# Patient Record
Sex: MALE | Race: WHITE | Employment: FULL TIME | URBAN - METROPOLITAN AREA
[De-identification: names, ages, dates, MRNs, and addresses within clinical notes are randomized per-mention and may not be internally consistent; named-entity substitution may affect disease eponyms.]

---

## 2017-07-27 ENCOUNTER — APPOINTMENT (OUTPATIENT)
Dept: GENERAL RADIOLOGY | Facility: CLINIC | Age: 52
End: 2017-07-27
Attending: FAMILY MEDICINE

## 2017-07-27 ENCOUNTER — HOSPITAL ENCOUNTER (EMERGENCY)
Facility: CLINIC | Age: 52
Discharge: HOME | End: 2017-07-28
Attending: FAMILY MEDICINE | Admitting: FAMILY MEDICINE
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
## 2017-07-27 DIAGNOSIS — S52.121A CLOSED DISPLACED FRACTURE OF HEAD OF RIGHT RADIUS, INITIAL ENCOUNTER: ICD-10-CM

## 2017-07-27 DIAGNOSIS — M79.631 PAIN OF RIGHT FOREARM: ICD-10-CM

## 2017-07-27 PROCEDURE — 99284 EMERGENCY DEPT VISIT MOD MDM: CPT | Performed by: FAMILY MEDICINE

## 2017-07-27 PROCEDURE — 73070 X-RAY EXAM OF ELBOW: CPT | Mod: TC,RT

## 2017-07-27 PROCEDURE — 99284 EMERGENCY DEPT VISIT MOD MDM: CPT | Mod: Z6 | Performed by: FAMILY MEDICINE

## 2017-07-27 PROCEDURE — 73090 X-RAY EXAM OF FOREARM: CPT | Mod: TC,RT

## 2017-07-27 NOTE — LETTER
[facility]  Emergency Room  [address] [place]   [address]  Tel: [phone]   Fax: [phone]  2017    Antonio Ravi  [address] [address]  [phone] (home)     : 1965          To Whom it May Concern:    Antonio Ravi was seen in our Emergency Department today, 2017. He has a fracture to his right elbow area and needs to remain in the sling until seen for recheck in the Ortho clinic on Thursday this next week. He may return to work as long as he is allowed to use the sling and not use his right arm. He should not drive or operate machinery if needing to use narcotic pain medications for his elbow pain.      Please contact me for questions or concerns.    Sincerely,      Ananth Scruggs  Physician  [facility]

## 2017-07-27 NOTE — ED AVS SNAPSHOT
[facility] Emergency Department    [address] [place] [address]    Phone:  [phone]    Fax:  [phone]                                       Antonio Ravi   MRN: [id number]    Department:  [facility] Emergency Department   Date of Visit:  7/27/2017           After Visit Summary Signature Page     I have received my discharge instructions, and my questions have been answered. I have discussed any challenges I see with this plan with the nurse or doctor.    ..........................................................................................................................................  Patient/Patient Representative Signature      ..........................................................................................................................................  Patient Representative Print Name and Relationship to Patient    ..................................................               ................................................  Date                                            Time    ..........................................................................................................................................  Reviewed by Signature/Title    ...................................................              ..............................................  Date                                                            Time

## 2017-07-28 VITALS
HEART RATE: 78 BPM | DIASTOLIC BLOOD PRESSURE: 109 MMHG | OXYGEN SATURATION: 95 % | RESPIRATION RATE: 20 BRPM | BODY MASS INDEX: 48.67 KG/M2 | TEMPERATURE: 98.4 F | WEIGHT: 315 LBS | SYSTOLIC BLOOD PRESSURE: 138 MMHG

## 2017-07-28 RX ORDER — HYDROCODONE BITARTRATE AND ACETAMINOPHEN 5; 325 MG/1; MG/1
1-2 TABLET ORAL EVERY 6 HOURS PRN
Qty: 15 TABLET | Refills: 0 | Status: SHIPPED | OUTPATIENT
Start: 2017-07-28 | End: 2017-07-28

## 2017-07-28 RX ORDER — IBUPROFEN 200 MG
600 TABLET ORAL EVERY 6 HOURS PRN
Refills: 0 | COMMUNITY
Start: 2017-07-28 | End: 2017-07-31

## 2017-07-28 RX ORDER — HYDROCODONE BITARTRATE AND ACETAMINOPHEN 5; 325 MG/1; MG/1
1-2 TABLET ORAL EVERY 6 HOURS PRN
Qty: 15 TABLET | Refills: 0 | Status: SHIPPED | OUTPATIENT
Start: 2017-07-28 | End: 2017-08-10

## 2017-07-28 ASSESSMENT — ENCOUNTER SYMPTOMS
WOUND: 0
COLOR CHANGE: 0
JOINT SWELLING: 1
NECK PAIN: 0
NUMBNESS: 1
NECK STIFFNESS: 0
ARTHRALGIAS: 1
ACTIVITY CHANGE: 1
BACK PAIN: 0
CHILLS: 0

## 2017-07-28 NOTE — DISCHARGE INSTRUCTIONS
Please read and follow the handout(s) instructions. Return, if needed, for increased or worsening symptoms and as directed by the handout(s).    See the note for work.    Do not drive if using the hydrocodone medication.    You will be scheduled for a follow-up appointment in your clinic next week.    You may apply ice or cold packs to the area for 10-15 minutes every 1-2 hours as needed to relieve pain and/or swelling.    Use the sling until rechecked in the Ortho clinic.    I hope you feel better soon!    Electronically signed, Ananth Scruggs DO

## 2017-07-28 NOTE — ED PROVIDER NOTES
History     Chief Complaint   Patient presents with     Arm Injury     HPI  Antonio Ravi is a 51 year old male who presented to the emergency room with concerns of pain to his right elbow and forearm.  Patient states that he was at his home approximately 1 hour ago when he went outside to walk on the sidewalk and tripped over some children's toys on the sidewalk causing him to fall forward using his right arm to brace his fall.  He was on his way to work and so did continue to go to work but once at work was unable to perform his work duties do to significant pain to his right elbow and forearm region.  He denies other injury associated with the fall.  Patient states that he has had history for surgery to the left shoulder but this was not injured in the fall today.  Patient refused offer of pain medications at the time of exam.  Ice applied to the right elbow area.    I have reviewed the Medications, Allergies, Past Medical and Surgical History, and Social History in the Epic system.    Patient Active Problem List   Diagnosis     Patellar tendinitis of right knee     Prepatellar bursitis of right knee       History reviewed. No pertinent past medical history.     Past Surgical History:   Procedure Laterality Date     ORTHOPEDIC SURGERY      left shoulder       No family history on file.    Social History     Social History     Marital status: Single     Spouse name: N/A     Number of children: N/A     Years of education: N/A     Occupational History     Not on file.     Social History Main Topics     Smoking status: Current Every Day Smoker     Packs/day: 1.00     Smokeless tobacco: Never Used     Alcohol use Yes      Comment: occassionally     Drug use: Not on file     Sexual activity: Not on file     Other Topics Concern     Not on file     Social History Narrative       Current Outpatient Rx   Medication Sig Dispense Refill     ibuprofen (ADVIL,MOTRIN) 200 MG tablet Take 4 tablets (800 mg) by mouth every  8 hours as needed for pain (with food)         No Known Allergies      There is no immunization history on file for this patient.      Review of Systems   Constitutional: Positive for activity change. Negative for chills.   Musculoskeletal: Positive for arthralgias (right elbow pain) and joint swelling (wrist). Negative for back pain, neck pain and neck stiffness.   Skin: Negative for color change and wound.   Neurological: Positive for numbness (states he has a tingling sensation from his elbow to his fingers involving all the fingers since the fall occurred.).   All other systems reviewed and are negative.      Physical Exam   BP: (!) 140/124  Pulse: 80  Temp: 98.4  F (36.9  C)  Resp: 20  Weight: (!) 145.2 kg (320 lb)  SpO2: 95 %  Physical Exam   Constitutional: He is oriented to person, place, and time. He appears well-developed and well-nourished. He appears distressed (right elbow pain symptoms.).   Morbidly obese 51-year-old appears to be in distress secondary to right forearm and elbow pain.   HENT:   Head: Normocephalic and atraumatic.   Eyes: Conjunctivae and EOM are normal. Pupils are equal, round, and reactive to light.   Neck: Normal range of motion. Neck supple.   Cardiovascular: Normal rate and intact distal pulses.    Pulmonary/Chest: Effort normal. No respiratory distress.   Abdominal: There is no tenderness.   Musculoskeletal:        Right elbow: He exhibits decreased range of motion, swelling and effusion (effusion noted to the olecranon bursal area right elbow.). He exhibits no deformity and no laceration. Tenderness (other diffuse tenderness surrounding the right elbow.  No point tenderness to the medial epicondyle, lateral epicondyle, or olecranon process identified on palpation.) found.   Neurological: He is alert and oriented to person, place, and time. No sensory deficit. He exhibits normal muscle tone.   Patient describes a tingling sensation in his right forearm and hand involving all of  his fingers but has good touch sensation distally in the right hand and fingers.  He has normal motor strength.   Skin: Skin is warm. No bruising and no rash noted.   Nursing note and vitals reviewed.      ED Course     ED Course     Procedures             Critical Care time:  none            Results for orders placed or performed during the hospital encounter of 07/27/17 (from the past 24 hour(s))   XR Elbow Right 2 Views    Narrative    XR ELBOW RT 2 VW, XR FOREARM RT 2 VW  7/28/2017 12:00 AM      HISTORY: Fall, pain.     COMPARISON: None.      Impression    IMPRESSION: There is a mildly displaced fracture of the radial head.  No other fracture or dislocation. Elbow joint effusion.   Radius/Ulna XR, PA & LAT, right    Narrative    XR ELBOW RT 2 VW, XR FOREARM RT 2 VW  7/28/2017 12:00 AM      HISTORY: Fall, pain.     COMPARISON: None.      Impression    IMPRESSION: There is a mildly displaced fracture of the radial head.  No other fracture or dislocation. Elbow joint effusion.         Assessments & Plan (with Medical Decision Making)  51 yr old male to the ER with concerns about right elbow area pain following a fall while walking earlier tonight. Exam findings consistent with a proximal radial head fracture. I spoke with Dr. Ananth Carrillo, Ortho, who felt the patient may not need surgery but recommended he be rechecked in the Ortho clinic this next week. Sling applied to the right arm and the patient was scheduled with the Ortho clinic on Thursday.  A work ability note was generated for his employer.  He was prescribed additional pain medications to use as needed.       I have reviewed the nursing notes.    I have reviewed the findings, diagnosis, plan and need for follow up with the patient.      Discharge Medication List as of 7/28/2017  1:14 AM      START taking these medications    Details   HYDROcodone-acetaminophen (NORCO) 5-325 MG per tablet Take 1-2 tablets by mouth every 6 hours as needed for moderate to  severe pain, Disp-15 tablet, R-0, Local Print                I verbally discussed the findings of the evaluation today in the ER. I have verbally discussed with Antonio the suggested treatment(s) as described in the discharge instructions and handouts. I have prescribed the above listed medications and instructed him on appropriate use of these medications.      I have verbally suggested he follow-up in his clinic or return to the ER for increased symptoms. See the follow-up recommendations documented  in the after visit summary in this visit's EPIC chart.    Final diagnoses:   Closed displaced fracture of head of right radius, initial encounter   Fall, initial encounter   Pain of right forearm       7/27/2017   [facility] EMERGENCY DEPARTMENT     Ananth Scruggs,   07/28/17 0231

## 2017-08-03 ENCOUNTER — RADIANT APPOINTMENT (OUTPATIENT)
Dept: GENERAL RADIOLOGY | Facility: CLINIC | Age: 52
End: 2017-08-03
Attending: ORTHOPAEDIC SURGERY

## 2017-08-03 ENCOUNTER — OFFICE VISIT (OUTPATIENT)
Dept: ORTHOPEDICS | Facility: CLINIC | Age: 52
End: 2017-08-03

## 2017-08-03 VITALS — HEIGHT: 67 IN | BODY MASS INDEX: 49.44 KG/M2 | RESPIRATION RATE: 18 BRPM | WEIGHT: 315 LBS

## 2017-08-03 DIAGNOSIS — M79.644 PAIN OF FINGER OF RIGHT HAND: ICD-10-CM

## 2017-08-03 DIAGNOSIS — S52.121A CLOSED DISPLACED FRACTURE OF HEAD OF RIGHT RADIUS, INITIAL ENCOUNTER: Primary | ICD-10-CM

## 2017-08-03 PROCEDURE — 99203 OFFICE O/P NEW LOW 30 MIN: CPT | Performed by: ORTHOPAEDIC SURGERY

## 2017-08-03 PROCEDURE — 73130 X-RAY EXAM OF HAND: CPT | Mod: TC

## 2017-08-03 ASSESSMENT — PAIN SCALES - GENERAL: PAINLEVEL: MODERATE PAIN (4)

## 2017-08-03 NOTE — LETTER
Antonio Ravi  [address] [address]        August 3, 2017           To Whom It May Concern:    Antonio Ravi was seen in our clinic. He may return to work with the following: no use of Right upper extremity until follow up. Next appointment is 1 week.      Sincerely,        Dane Perez, DO

## 2017-08-03 NOTE — MR AVS SNAPSHOT
"              After Visit Summary   8/3/2017    Antonio Ravi    MRN: [id number]           Patient Information     Date Of Birth          1965        Visit Information        Provider Department      8/3/2017 8:10 AM Dane Perez, DO [facility]         Follow-ups after your visit        Who to contact     If you have questions or need follow up information about today's clinic visit or your schedule please contact [facility] directly at [phone].  Normal or non-critical lab and imaging results will be communicated to you by Weeblyhart, letter or phone within 4 business days after the clinic has received the results. If you do not hear from us within 7 days, please contact the clinic through Weeblyhart or phone. If you have a critical or abnormal lab result, we will notify you by phone as soon as possible.  Submit refill requests through [employer] [facility] or call your pharmacy and they will forward the refill request to us. Please allow 3 business days for your refill to be completed.          Additional Information About Your Visit        [facility] Information     [employer] [facility] lets you send messages to your doctor, view your test results, renew your prescriptions, schedule appointments and more. To sign up, go to [email or web address] . Click on \"Log in\" on the left side of the screen, which will take you to the Welcome page. Then click on \"Sign up Now\" on the right side of the page.     You will be asked to enter the access code listed below, as well as some personal information. Please follow the directions to create your username and password.     Your access code is: [email or web address]  Expires: 10/26/2017  1:14 AM     Your access code will  in 90 days. If you need help or a new code, please call your [facility] or [phone].        Care EveryWhere ID     This is your Care EveryWhere ID. This could be used by other organizations to access your [facility] medical " "records  [id number]        Your Vitals Were     Respirations Height BMI (Body Mass Index)             18 1.708 m (5' 7.25\") 57.44 kg/m2          Blood Pressure from Last 3 Encounters:   07/28/17 (!) 138/109   08/23/16 (!) 148/100   08/31/15 116/72    Weight from Last 3 Encounters:   08/03/17 (!) 167.6 kg (369 lb 8 oz)   07/27/17 (!) 145.2 kg (320 lb)   08/23/16 (!) 140.6 kg (310 lb)              Today, you had the following     No orders found for display       Primary Care Provider    Physician No Ref-Primary       No address on file        Equal Access to Services     VANESSA SHARP : Carlin Cooper, giuseppe lang, aleks kaalmaverito mauricio, amos wilson . So [facility] [phone].    ATENCIÓN: Si habla español, tiene a sampson disposición servicios gratuitos de asistencia lingüística. Llame al [phone].    We comply with applicable federal civil rights laws and [place] laws. We do not discriminate on the basis of race, color, national origin, age, disability sex, sexual orientation or gender identity.            Thank you!     Thank you for choosing [facility]  for your care. Our goal is always to provide you with excellent care. Hearing back from our patients is one way we can continue to improve our services. Please take a few minutes to complete the written survey that you may receive in the mail after your visit with us. Thank you!             Your Updated Medication List - Protect others around you: Learn how to safely use, store and throw away your medicines at www.disposemymeds.org.          This list is accurate as of: 8/3/17  8:14 AM.  Always use your most recent med list.                   Brand Name Dispense Instructions for use Diagnosis    HYDROcodone-acetaminophen 5-325 MG per tablet    NORCO    15 tablet    Take 1-2 tablets by mouth every 6 hours as needed for moderate to severe pain    Closed displaced fracture of head of right radius, " initial encounter, Pain of right forearm

## 2017-08-03 NOTE — NURSING NOTE
"Chief Complaint   Patient presents with     Musculoskeletal Problem     Right elbow injury- DOI: 7/27/17-tripped and fell     Consult     ref: ED       Initial Resp 18  Ht 1.708 m (5' 7.25\")  Wt (!) 167.6 kg (369 lb 8 oz)  BMI 57.44 kg/m2 Estimated body mass index is 57.44 kg/(m^2) as calculated from the following:    Height as of this encounter: 1.708 m (5' 7.25\").    Weight as of this encounter: 167.6 kg (369 lb 8 oz).  Medication Reconciliation: complete   Steph/SUKHI     "

## 2017-08-03 NOTE — PROGRESS NOTES
ORTHOPEDIC CONSULT      Chief Complaint: Antonio Ravi is a 51 year old male who is being seen for Chief Complaint   Patient presents with     Musculoskeletal Problem     Right elbow injury- DOI: 7/27/17-tripped and fell     Consult     ref: ED     Antonio Ravi is a 51 year old male who is seen in consultation at the request of Dr. Scruggs for evaluation of right proximal radius head fracture.    History of Present Illness:   Right handed male.   Patient seen in ED on 7/27/17 after a fall and right elbow and forearm pain.  Patient was found to have proximal radial head fracture.  Patient was placed in a sling and recommended to follow up with orthopedics. Patient states since the fall the swelling in his hand and elbow will go down with elevation but once in the sling or with working the swelling increases again.  He states the elbow pain is about the same as it was 7 days ago after the fall but states the pain in his hand along the pinky finger is worsening.  He also states the global hand numbness is about the same, but can feel just feels more numb than the left hand.  Patient states initially hand was not bad and did not bring up the hand pain to the ED doctor but the hand pain has been progressively worse.      Mechanism of Injury: A direct strike to the affected area.  Pain resulted ground level fall. Tripped over toys fell onto outstretched right arm  Location: right elbow (global elbow), hand along pinky finger  Duration of Pain:  7 days since fall  Rating of Pain:  Moderate at times, severe at times.    Pain Quality: sharp and pressure  Pain is better with: Rest and elevation along with Norco  Pain is worse with:  working, being in the sling, not elevating arm  Treatment so far consists of:  rest, Ice, Norco, sling.   Associated Features: Swelling, Numbness:  entire hand  Prior history of related problems:   No previous problem in the affected area.  Pain is limiting work. Pain is limiting normal  "activities of daily living. Pain is waking at night. The patient has pain at rest.  Here for Orthopedic consultation.  Pain is about same to elbow worse to hand.        Patient's past medical, surgical, social and family histories reviewed.     No past medical history on file.    Past Surgical History:   Procedure Laterality Date     ORTHOPEDIC SURGERY      left shoulder       Medications:    Current Outpatient Prescriptions on File Prior to Visit:  HYDROcodone-acetaminophen (NORCO) 5-325 MG per tablet Take 1-2 tablets by mouth every 6 hours as needed for moderate to severe pain     No current facility-administered medications on file prior to visit.     No Known Allergies    Social History     Occupational History     Not on file.     Social History Main Topics     Smoking status: Current Every Day Smoker     Packs/day: 1.00     Smokeless tobacco: Never Used     Alcohol use Yes      Comment: occassionally     Drug use: Not on file     Sexual activity: Not on file       No family history on file.    REVIEW OF SYSTEMS  10 point review systems performed otherwise negative as noted as per history of present illness.    Physical Exam:  Vitals: Resp 18  Ht 1.708 m (5' 7.25\")  Wt (!) 167.6 kg (369 lb 8 oz)  BMI 57.44 kg/m2  Constitutional: healthy, alert and in mild acute distress. Large body habitus   Psychiatric: mentation appears normal and affect normal/bright  NEURO: no focal deficits  RESP: Normal with easy respirations and no use of accessory muscles to breathe, no audible wheezing or retractions.   CV: RUE:  Shoulder to hand, cap refill <2, swelling to hand, radial pulse 2+.           Regular rate and rhythm by palpation  SKIN: No erythema, rashes, excoriation, or breakdown. No evidence of infection.   JOINT/EXTREMITIES:right Elbow Exam: Inspection: swelling  Right hand: swelling throughout fingers  Tender: global elbow, worse around radial head  Tender hand: 5th metacarpal region/mcp  through the small and " ring finger  Range of Motion: severely limited by pain in the elbow. Once working through the pain is able to get 30-95 degrees ROM with soft endpoints. Full forearm rotation.  Strength: limited by pain.  Finger shows no signs of instability at the MCP joint.    GAIT: not examined    Diagnostic Modalities:  right hand: no acute fracture, dislocation, or other abnormality visualized.  right elbow X-ray: mildly displaced distal radius head fracture  Independent visualization of the images was performed.      Impression: right minimally displaced Radial head fracture  Right ulnar-sided Hand pain-probable contusion    Plan:  All of the above pertinent physical exam and imaging modalities findings was reviewed with Antonio.    ROM is intact to right elbow along with distal extremity is also neurovascularly intact.  Right hand pain is likely due to bruising and no abnormalities seen on xray.  He does have some global hand numbness but this is not worsening.  ROM, CMS otherwise intact to hand.  With the radial head fracture will continue to recommend slinging with range of motion exercises to prevent contracture to the elbow.  Displacement of the radius head is borderline to recommend further intervention.  Discussed with patient and will continue conservative therapy but will bring him back early next week to re-evaluate.     I recommend conservative care for the patient to include rest, sling, non weigh-bearing, no pushing, pulling, and/or lifting, ROM.     Restrictions: encouraged ROM to elbow but no lifting pushing pulling and no lifting    Could work with above restrictions.    Return to clinic 1, week(s), or sooner as needed for changes.  Re-x-ray on return: Yes.    Scribed by:  ANG Cotton, CNP, DNP  8:46 AM  8/3/2017           Raymond Perez D.O.

## 2017-08-03 NOTE — Clinical Note
8/3/2017         RE: Antonio Ravi  [address] [facility] [address]        Dear Colleague,    Thank you for referring your patient, Antonio Ravi, to the [facility]. Please see a copy of my visit note below.    ORTHOPEDIC CONSULT      Chief Complaint: Antonio Ravi is a 51 year old male who is being seen for Chief Complaint   Patient presents with     Musculoskeletal Problem     Right elbow injury- DOI: 7/27/17-tripped and fell     Consult     ref: ED     Antonio Ravi is a 51 year old male who is seen in consultation at the request of Dr. Scruggs for evaluation of right proximal radius head fracture.    History of Present Illness:   Right handed male.   Patient seen in ED on 7/27/17 after a fall and right elbow and forearm pain.  Patient was found to have proximal radial head fracture.  Patient was placed in a sling and recommended to follow up with orthopedics. Patient states since the fall the swelling in his hand and elbow will go down with elevation but once in the sling or with working the swelling increases again.  He states the elbow pain is about the same as it was 7 days ago after the fall but states the pain in his hand along the pinky finger is worsening.  He also states the global hand numbness is about the same, but can feel just feels more numb than the left hand.  Patient states initially hand was not bad and did not bring up the hand pain to the ED doctor but the hand pain has been progressively worse.      Mechanism of Injury: A direct strike to the affected area.  Pain resulted ground level fall. Tripped over toys fell onto outstretched right arm  Location: right elbow (global elbow), hand along pinky finger  Duration of Pain:  7 days since fall  Rating of Pain:  Moderate at times, severe at times.    Pain Quality: sharp and pressure  Pain is better with: Rest and elevation along with Norco  Pain is worse with:  working, being in the sling, not  "elevating arm  Treatment so far consists of:  rest, Ice, Norco, sling.   Associated Features: Swelling, Numbness:  entire hand  Prior history of related problems:   No previous problem in the affected area.  Pain is limiting work. Pain is limiting normal activities of daily living. Pain is waking at night. The patient has pain at rest.  Here for Orthopedic consultation.  Pain is about same to elbow worse to hand.        Patient's past medical, surgical, social and family histories reviewed.     No past medical history on file.    Past Surgical History:   Procedure Laterality Date     ORTHOPEDIC SURGERY      left shoulder       Medications:    Current Outpatient Prescriptions on File Prior to Visit:  HYDROcodone-acetaminophen (NORCO) 5-325 MG per tablet Take 1-2 tablets by mouth every 6 hours as needed for moderate to severe pain     No current facility-administered medications on file prior to visit.     No Known Allergies    Social History     Occupational History     Not on file.     Social History Main Topics     Smoking status: Current Every Day Smoker     Packs/day: 1.00     Smokeless tobacco: Never Used     Alcohol use Yes      Comment: occassionally     Drug use: Not on file     Sexual activity: Not on file       No family history on file.    REVIEW OF SYSTEMS  10 point review systems performed otherwise negative as noted as per history of present illness.    Physical Exam:  Vitals: Resp 18  Ht 1.708 m (5' 7.25\")  Wt (!) 167.6 kg (369 lb 8 oz)  BMI 57.44 kg/m2  Constitutional: healthy, alert and in mild acute distress. Large body habitus   Psychiatric: mentation appears normal and affect normal/bright  NEURO: no focal deficits  RESP: Normal with easy respirations and no use of accessory muscles to breathe, no audible wheezing or retractions.   CV: RUE:  Shoulder to hand, cap refill <2, swelling to hand, radial pulse 2+.           Regular rate and rhythm by palpation  SKIN: No erythema, rashes, excoriation, " or breakdown. No evidence of infection.   JOINT/EXTREMITIES:right Elbow Exam: Inspection: swelling  Right hand: swelling throughout fingers  Tender: global elbow, worse around radial head  Tender hand: 5th metacarpal region/mcp  through the small and ring finger  Range of Motion: severely limited by pain in the elbow. Once working through the pain is able to get 30-95 degrees ROM with soft endpoints. Full forearm rotation.  Strength: limited by pain.  Finger shows no signs of instability at the MCP joint.    GAIT: not examined    Diagnostic Modalities:  right hand: no acute fracture, dislocation, or other abnormality visualized.  right elbow X-ray: mildly displaced distal radius head fracture  Independent visualization of the images was performed.      Impression: right minimally displaced Radial head fracture  Right ulnar-sided Hand pain-probable contusion    Plan:  All of the above pertinent physical exam and imaging modalities findings was reviewed with Antonio.    ROM is intact to right elbow along with distal extremity is also neurovascularly intact.  Right hand pain is likely due to bruising and no abnormalities seen on xray.  He does have some global hand numbness but this is not worsening.  ROM, CMS otherwise intact to hand.  With the radial head fracture will continue to recommend slinging with range of motion exercises to prevent contracture to the elbow.  Displacement of the radius head is borderline to recommend further intervention.  Discussed with patient and will continue conservative therapy but will bring him back early next week to re-evaluate.     I recommend conservative care for the patient to include rest, sling, non weigh-bearing, no pushing, pulling, and/or lifting, ROM.     Restrictions: encouraged ROM to elbow but no lifting pushing pulling and no lifting    Could work with above restrictions.    Return to clinic 1, week(s), or sooner as needed for changes.  Re-x-ray on return:  Yes.    Scribed by:  Wesly Blum, APRN, CNP, DNP  8:46 AM  8/3/2017           Raymond Perez D.O.    Again, thank you for allowing me to participate in the care of your patient.        Sincerely,        Dane Perez, DO

## 2017-08-10 ENCOUNTER — RADIANT APPOINTMENT (OUTPATIENT)
Dept: GENERAL RADIOLOGY | Facility: CLINIC | Age: 52
End: 2017-08-10
Attending: ORTHOPAEDIC SURGERY

## 2017-08-10 ENCOUNTER — OFFICE VISIT (OUTPATIENT)
Dept: ORTHOPEDICS | Facility: CLINIC | Age: 52
End: 2017-08-10

## 2017-08-10 VITALS — WEIGHT: 315 LBS | BODY MASS INDEX: 49.44 KG/M2 | TEMPERATURE: 98.3 F | HEIGHT: 67 IN

## 2017-08-10 DIAGNOSIS — S52.121D CLOSED DISPLACED FRACTURE OF HEAD OF RIGHT RADIUS WITH ROUTINE HEALING, SUBSEQUENT ENCOUNTER: Primary | ICD-10-CM

## 2017-08-10 DIAGNOSIS — S52.121A CLOSED DISPLACED FRACTURE OF HEAD OF RIGHT RADIUS: ICD-10-CM

## 2017-08-10 PROCEDURE — 99213 OFFICE O/P EST LOW 20 MIN: CPT | Performed by: ORTHOPAEDIC SURGERY

## 2017-08-10 PROCEDURE — 73080 X-RAY EXAM OF ELBOW: CPT | Mod: TC

## 2017-08-10 ASSESSMENT — PAIN SCALES - GENERAL: PAINLEVEL: MODERATE PAIN (4)

## 2017-08-10 NOTE — NURSING NOTE
"Chief Complaint   Patient presents with     RECHECK     right minimally displaced Radial head fracture, Right ulnar-sided Hand pain-probable contusion- DOI: 7/27/17       Initial Temp 98.3  F (36.8  C) (Temporal)  Ht 1.708 m (5' 7.25\")  Wt (!) 167.4 kg (369 lb)  BMI 57.36 kg/m2 Estimated body mass index is 57.36 kg/(m^2) as calculated from the following:    Height as of this encounter: 1.708 m (5' 7.25\").    Weight as of this encounter: 167.4 kg (369 lb).  Medication Reconciliation: complete   Steph/SUKHI     "

## 2017-08-10 NOTE — PROGRESS NOTES
Office Visit-Follow up    Chief Complaint: Antonio Ravi is a 51 year old male who is being seen for   Chief Complaint   Patient presents with     RECHECK     right minimally displaced Radial head fracture, Right ulnar-sided Hand pain-probable contusion- DOI: 7/27/17       History of Present Illness:   Today's Visit  Patient states he feels like he is improving on motion, pain overall is mildly improved but he states some activities like range of motion do worsen the the pain.  He has been using the sling, doing gentle AROM to the elbow, ice, elevating and occasional APAP.  He states the numbness and tingling to his finger tips is about the same and mostly in the index, middle, ring, finger.  Patient states hand pain and swelling is improved.  Overall mildly better, still sore and feels stiff in elbow.  Deep ache in the elbow with occasional sharp pain.  No new symptoms or problems.     He does note he can do ibuprofen but did have a kidney problem once with blood in his urine that they were concerned about his kidney but never followed up.  No previous bleeding ulcers or kidney failure history.     Visit from 8/3/17:  Right handed male.   Patient seen in ED on 7/27/17 after a fall and right elbow and forearm pain.  Patient was found to have proximal radial head fracture.  Patient was placed in a sling and recommended to follow up with orthopedics. Patient states since the fall the swelling in his hand and elbow will go down with elevation but once in the sling or with working the swelling increases again.  He states the elbow pain is about the same as it was 7 days ago after the fall but states the pain in his hand along the pinky finger is worsening.  He also states the global hand numbness is about the same, but can feel just feels more numb than the left hand.  Patient states initially hand was not bad and did not bring up the hand pain to the ED doctor but the hand pain has been progressively  "worse.   Mechanism of Injury: A direct strike to the affected area.  Pain resulted ground level fall. Tripped over toys fell onto outstretched right arm  Location: right elbow (global elbow), hand along pinky finger  Duration of Pain:  7 days since fall  Rating of Pain:  Moderate at times, severe at times.    Pain Quality: sharp and pressure  Pain is better with: Rest and elevation along with Norco  Pain is worse with:  working, being in the sling, not elevating arm  Treatment so far consists of:  rest, Ice, Norco, sling.   Associated Features: Swelling, Numbness:  entire hand  Prior history of related problems:   No previous problem in the affected area.  Pain is limiting work. Pain is limiting normal activities of daily living. Pain is waking at night. The patient has pain at rest.  Here for Orthopedic consultation.  Pain is about same to elbow worse to hand.    Physical Exam:  Vitals: Temp 98.3  F (36.8  C) (Temporal)  Ht 1.708 m (5' 7.25\")  Wt (!) 167.4 kg (369 lb)  BMI 57.36 kg/m2  BMI= Body mass index is 57.36 kg/(m^2).  Constitutional: healthy, alert and no acute distress   Psychiatric: mentation appears normal and affect normal/bright  NEURO: no focal deficits  RESP: Normal with easy respirations and no use of accessory muscles to breathe, no audible wheezing or retractions  CV: RUE:  shoulder down to hand-  Edema to hand improved         Regular rate and rhythm by palpation  SKIN: No erythema, rashes, excoriation, or breakdown. No evidence of infection.   JOINT/EXTREMITIES:right Elbow Exam: Inspection: mild swelling to elbow.   Tender: to global elbow  Non-tender: hand is now non-tender  Range of Motion. limited by pain in the elbow. Once working through the pain is able to get 5-120 degrees ROM with soft endpoints. Full forearm rotation.  There is some stiffness present  Strength: limited by pain.     Diagnostic Modalities:right elbow X-ray: mildly displaced distal radius head fracture again seen, " largely unchanged from previous  Independent visualization of the images was performed.      Impression :right minimally displaced Radial head fracture    Plan:  All of the above pertinent physical exam and imaging modalities findings was reviewed with Antonio.    Given his possible past kidney issues and no formal follow-up I did discuss with patient contacting his PCP before starting NSAIDs.     Patient clinically is showing some signs of improvement, imaging is unchanged.  Recommended continue conservative care.    I recommend conservative care for the patient to include Tylenol, focused self directed gentle AROM, formal OT for gentle AROM and PROM to elbow therapy, rest, sling, no pushing, pulling, and/or lifting. Today I provided or dispensed referral for OT    Restrictions: no pushing pulling lifting to right arm for likely another 4 weeks.    Can continue to work with restrictions of no lifting pulling pushing with right arm.       Return to clinic 4, week(s), or sooner as needed for changes.  Re-x-ray on return: Yes.    Scribed by:  ANG Cotton, CNP, DNP  8:16 AM  8/10/2017      Raymond Perez D.O.

## 2017-08-10 NOTE — MR AVS SNAPSHOT
"              After Visit Summary   8/10/2017    Antonio Ravi    MRN: [id number]           Patient Information     Date Of Birth          1965        Visit Information        Provider Department      8/10/2017 8:00 AM Dane Perez, DO [facility]        Today's Diagnoses     Closed displaced fracture of head of right radius    -  1       Follow-ups after your visit        Who to contact     If you have questions or need follow up information about today's clinic visit or your schedule please contact [facility] directly at [phone].  Normal or non-critical lab and imaging results will be communicated to you by Starbateshart, letter or phone within 4 business days after the clinic has received the results. If you do not hear from us within 7 days, please contact the clinic through Biolaset or phone. If you have a critical or abnormal lab result, we will notify you by phone as soon as possible.  Submit refill requests through Choister or call your pharmacy and they will forward the refill request to us. Please allow 3 business days for your refill to be completed.          Additional Information About Your Visit        MyChart Information     Choister lets you send messages to your doctor, view your test results, renew your prescriptions, schedule appointments and more. To sign up, go to [email or web address] . Click on \"Log in\" on the left side of the screen, which will take you to the Welcome page. Then click on \"Sign up Now\" on the right side of the page.     You will be asked to enter the access code listed below, as well as some personal information. Please follow the directions to create your username and password.     Your access code is: [email or web address]  Expires: 10/26/2017  1:14 AM     Your access code will  in 90 days. If you need help or a new code, please call your [facility] or [phone].        Care EveryWhere ID     This is your Care EveryWhere " "ID. This could be used by other organizations to access your [place] medical records  [id number]        Your Vitals Were     Temperature Height BMI (Body Mass Index)             98.3  F (36.8  C) (Temporal) 1.708 m (5' 7.25\") 57.36 kg/m2          Blood Pressure from Last 3 Encounters:   07/28/17 (!) 138/109   08/23/16 (!) 148/100   08/31/15 116/72    Weight from Last 3 Encounters:   08/10/17 (!) 167.4 kg (369 lb)   08/03/17 (!) 167.6 kg (369 lb 8 oz)   07/27/17 (!) 145.2 kg (320 lb)               Primary Care Provider    Physician No Ref-Primary       No address on file        Equal Access to Services     VANESSA SHARP : Carlin brown Sojoseline, waaxda luqadaha, qaybta kaalmada adeegyada, amos wilson . So [facility] [place] [phone].    ATENCIÓN: Si habla español, tiene a sampson disposición servicios gratuitos de asistencia lingüística. Llame al [phone].    We comply with applicable federal civil rights laws and [place] laws. We do not discriminate on the basis of race, color, national origin, age, disability sex, sexual orientation or gender identity.            Thank you!     Thank you for choosing [facility]  for your care. Our goal is always to provide you with excellent care. Hearing back from our patients is one way we can continue to improve our services. Please take a few minutes to complete the written survey that you may receive in the mail after your visit with us. Thank you!             Your Updated Medication List - Protect others around you: Learn how to safely use, store and throw away your medicines at www.disposemymeds.org.      Notice  As of 8/10/2017  8:05 AM    You have not been prescribed any medications.      "

## 2017-08-10 NOTE — LETTER
Antonio Ravi  [address] [address] [address]        August 10, 2017           To Whom It May Concern:    Antonio Ravi was seen in our clinic. He may return to work with the following: Right upper extremity-no push, pull or lift until follow up.    Next appointment is 4 weeks.      Sincerely,        Dane Perez, DO

## 2017-08-10 NOTE — Clinical Note
8/10/2017         RE: Antonio Ravi  [address] [address]  [place] [address]        Dear Colleague,    Thank you for referring your patient, Antonio Ravi, to the [facility]. Please see a copy of my visit note below.    Office Visit-Follow up    Chief Complaint: Antonio Ravi is a 51 year old male who is being seen for   Chief Complaint   Patient presents with     RECHECK     right minimally displaced Radial head fracture, Right ulnar-sided Hand pain-probable contusion- DOI: 7/27/17       History of Present Illness:   Today's Visit  Patient states he feels like he is improving on motion, pain overall is mildly improved but he states some activities like range of motion do worsen the the pain.  He has been using the sling, doing gentle AROM to the elbow, ice, elevating and occasional APAP.  He states the numbness and tingling to his finger tips is about the same and mostly in the index, middle, ring, finger.  Patient states hand pain and swelling is improved.  Overall mildly better, still sore and feels stiff in elbow.  Deep ache in the elbow with occasional sharp pain.  No new symptoms or problems.     He does note he can do ibuprofen but did have a kidney problem once with blood in his urine that they were concerned about his kidney but never followed up.  No previous bleeding ulcers or kidney failure history.     Visit from 8/3/17:  Right handed male.   Patient seen in ED on 7/27/17 after a fall and right elbow and forearm pain.  Patient was found to have proximal radial head fracture.  Patient was placed in a sling and recommended to follow up with orthopedics. Patient states since the fall the swelling in his hand and elbow will go down with elevation but once in the sling or with working the swelling increases again.  He states the elbow pain is about the same as it was 7 days ago after the fall but states the pain in his hand along the pinky finger is worsening.  He also states  "the global hand numbness is about the same, but can feel just feels more numb than the left hand.  Patient states initially hand was not bad and did not bring up the hand pain to the ED doctor but the hand pain has been progressively worse.   Mechanism of Injury: A direct strike to the affected area.  Pain resulted ground level fall. Tripped over toys fell onto outstretched right arm  Location: right elbow (global elbow), hand along pinky finger  Duration of Pain:  7 days since fall  Rating of Pain:  Moderate at times, severe at times.    Pain Quality: sharp and pressure  Pain is better with: Rest and elevation along with Norco  Pain is worse with:  working, being in the sling, not elevating arm  Treatment so far consists of:  rest, Ice, Norco, sling.   Associated Features: Swelling, Numbness:  entire hand  Prior history of related problems:   No previous problem in the affected area.  Pain is limiting work. Pain is limiting normal activities of daily living. Pain is waking at night. The patient has pain at rest.  Here for Orthopedic consultation.  Pain is about same to elbow worse to hand.    Physical Exam:  Vitals: Temp 98.3  F (36.8  C) (Temporal)  Ht 1.708 m (5' 7.25\")  Wt (!) 167.4 kg (369 lb)  BMI 57.36 kg/m2  BMI= Body mass index is 57.36 kg/(m^2).  Constitutional: healthy, alert and no acute distress   Psychiatric: mentation appears normal and affect normal/bright  NEURO: no focal deficits  RESP: Normal with easy respirations and no use of accessory muscles to breathe, no audible wheezing or retractions  CV: RUE:  shoulder down to hand-  Edema to hand improved         Regular rate and rhythm by palpation  SKIN: No erythema, rashes, excoriation, or breakdown. No evidence of infection.   JOINT/EXTREMITIES:right Elbow Exam: Inspection: mild swelling to elbow.   Tender: to global elbow  Non-tender: hand is now non-tender  Range of Motion. limited by pain in the elbow. Once working through the pain is able to " get 5-120 degrees ROM with soft endpoints. Full forearm rotation.  There is some stiffness present  Strength: limited by pain.     Diagnostic Modalities:right elbow X-ray: mildly displaced distal radius head fracture again seen, largely unchanged from previous  Independent visualization of the images was performed.      Impression :right minimally displaced Radial head fracture    Plan:  All of the above pertinent physical exam and imaging modalities findings was reviewed with Antonio.    Given his possible past kidney issues and no formal follow-up I did discuss with patient contacting his PCP before starting NSAIDs.     Patient clinically is showing some signs of improvement, imaging is unchanged.  Recommended continue conservative care.    I recommend conservative care for the patient to include Tylenol, focused self directed gentle AROM, formal OT for gentle AROM and PROM to elbow therapy, rest, sling, no pushing, pulling, and/or lifting. Today I provided or dispensed referral for OT    Restrictions: no pushing pulling lifting to right arm for likely another 4 weeks.    Can continue to work with restrictions of no lifting pulling pushing with right arm.       Return to clinic 4, week(s), or sooner as needed for changes.  Re-x-ray on return: Yes.    Scribed by:  Wesly Blum, APRN, CNP, DNP  8:16 AM  8/10/2017      Raymond Perez D.O.          Again, thank you for allowing me to participate in the care of your patient.        Sincerely,        Dane Perez, DO

## 2017-09-07 ENCOUNTER — OFFICE VISIT (OUTPATIENT)
Dept: ORTHOPEDICS | Facility: CLINIC | Age: 52
End: 2017-09-07

## 2017-09-07 ENCOUNTER — RADIANT APPOINTMENT (OUTPATIENT)
Dept: GENERAL RADIOLOGY | Facility: CLINIC | Age: 52
End: 2017-09-07
Attending: ORTHOPAEDIC SURGERY

## 2017-09-07 VITALS — TEMPERATURE: 97.4 F | HEIGHT: 67 IN | WEIGHT: 315 LBS | BODY MASS INDEX: 49.44 KG/M2

## 2017-09-07 DIAGNOSIS — S52.121D CLOSED DISPLACED FRACTURE OF HEAD OF RIGHT RADIUS WITH ROUTINE HEALING, SUBSEQUENT ENCOUNTER: Primary | ICD-10-CM

## 2017-09-07 DIAGNOSIS — S52.121D CLOSED DISPLACED FRACTURE OF HEAD OF RIGHT RADIUS WITH ROUTINE HEALING, SUBSEQUENT ENCOUNTER: ICD-10-CM

## 2017-09-07 PROCEDURE — 99213 OFFICE O/P EST LOW 20 MIN: CPT | Performed by: ORTHOPAEDIC SURGERY

## 2017-09-07 PROCEDURE — 73080 X-RAY EXAM OF ELBOW: CPT | Mod: TC

## 2017-09-07 ASSESSMENT — PAIN SCALES - GENERAL: PAINLEVEL: MILD PAIN (3)

## 2017-09-07 NOTE — Clinical Note
9/7/2017         RE: Antonio Ravi  [address]  [facility] [address]        Dear Colleague,    Thank you for referring your patient, Antonio Ravi, to the [facility]. Please see a copy of my visit note below.    Office Visit-Follow up    Chief Complaint: Antonio Ravi is a 51 year old male who is being seen for   Chief Complaint   Patient presents with     RECHECK     right minimally displaced Radial head fracture, Right ulnar-sided Hand pain-probable contusion- DOI: 7/27/17       History of Present Illness:   Today's visit  At previous visit was recommended for formal OT to continue to work on ROM but no lifting/pushing/pulling.  Since the last visit: he states overall the pain is better.  He has been trying very light  strength type exercise at home and states that does cause some pain.  He notes that with supination and pronation type activities will cause clicking and pain.  Has been using sling, has been following weight restrictions is working with no pulling pushing lifting. No numbness/tingling no pain to hand.  Overall feels improving.       8/10/17 visit  Patient states he feels like he is improving on motion, pain overall is mildly improved but he states some activities like range of motion do worsen the the pain.  He has been using the sling, doing gentle AROM to the elbow, ice, elevating and occasional APAP.  He states the numbness and tingling to his finger tips is about the same and mostly in the index, middle, ring, finger.  Patient states hand pain and swelling is improved.  Overall mildly better, still sore and feels stiff in elbow.  Deep ache in the elbow with occasional sharp pain.  No new symptoms or problems.      He does note he can do ibuprofen but did have a kidney problem once with blood in his urine that they were concerned about his kidney but never followed up.  No previous bleeding ulcers or kidney failure history.      Visit from  8/3/17:  Right handed male.   Patient seen in ED on 7/27/17 after a fall and right elbow and forearm pain.  Patient was found to have proximal radial head fracture.  Patient was placed in a sling and recommended to follow up with orthopedics. Patient states since the fall the swelling in his hand and elbow will go down with elevation but once in the sling or with working the swelling increases again.  He states the elbow pain is about the same as it was 7 days ago after the fall but states the pain in his hand along the pinky finger is worsening.  He also states the global hand numbness is about the same, but can feel just feels more numb than the left hand.  Patient states initially hand was not bad and did not bring up the hand pain to the ED doctor but the hand pain has been progressively worse.   Mechanism of Injury: A direct strike to the affected area.  Pain resulted ground level fall. Tripped over toys fell onto outstretched right arm  Location: right elbow (global elbow), hand along pinky finger  Duration of Pain:  7 days since fall  Rating of Pain:  Moderate at times, severe at times.    Pain Quality: sharp and pressure  Pain is better with: Rest and elevation along with Norco  Pain is worse with:  working, being in the sling, not elevating arm  Treatment so far consists of:  rest, Ice, Norco, sling.   Associated Features: Swelling, Numbness:  entire hand  Prior history of related problems:   No previous problem in the affected area.  Pain is limiting work. Pain is limiting normal activities of daily living. Pain is waking at night. The patient has pain at rest.  Here for Orthopedic consultation.  Pain is about same to elbow worse to hand.         REVIEW OF SYSTEMS  General: negative for, night sweats, dizziness, fatigue  Resp: No shortness of breath and no cough  CV: negative for chest pain, syncope or near-syncope  GI: negative for nausea, vomiting and diarrhea  : negative for dysuria and  "hematuria  Musculoskeletal: as above  Neurologic: negative for syncope   Hematologic: negative for bleeding disorder    Physical Exam:  Vitals: Temp 97.4  F (36.3  C) (Temporal)  Ht 1.708 m (5' 7.25\")  Wt (!) 163.5 kg (360 lb 8 oz)  BMI 56.04 kg/m2  BMI= Body mass index is 56.04 kg/(m^2).  Constitutional: healthy, alert and no acute distress   Psychiatric: mentation appears normal and affect normal/bright  NEURO: no focal deficits  RESP: Normal with easy respirations and no use of accessory muscles to breathe, no audible wheezing or retractions  CV: RUE:  shoulder down to hand-  No edema/swelling to hand         Regular rate and rhythm by palpation  SKIN: No erythema, rashes, excoriation, or breakdown. No evidence of infection.   JOINT/EXTREMITIES:right Elbow Exam: Inspection: no swelling to elbow.   Tender: to global elbow, especially radial head  Non-tender: hand is now non-tender  Range of Motion. active range of motion 5-120 degrees ROM with soft endpoints.  This is equal to left arm. Full forearm rotation.  There is some stiffness present  Strength: limited by pain.   Full ROM,  strength to wrist/hand.    Distal neurovascular status intact on right hand  2+ radial pulse        Diagnostic Modalities:  right elbow X-ray reviewed and compared against previous: mildly displaced distal radius head fracture again seen, largely unchanged from previous  Independent visualization of the images was performed.      Impression:right minimally displaced Radial head fracture    Plan:  All of the above pertinent physical exam and imaging modalities findings was reviewed with Antonio.    Per patient if he were to start bearing weight with right arm at work it would be \"all or nothing\".  At this point I feel it would be ok to start slow increase on weightbearing with therapy but to remain no push, pull, lift on RUE.  Letter to work written.   Plan to continue to monitor the clicking.  Continue to work on ROM, therapy, " and can start slow increase in weight bearing.    No more use of sling.     Work restrictions: no lift push pull      Return to clinic 3-4, week(s), or sooner as needed for changes.  Re-x-ray on return: Yes.    Scribed by:  Wesly Blum, APRN, CNP, DNP  9:03 AM  9/7/2017    I attest I have seen and evaluated the patient.  I agree with above impression and plan.  Raymond Perez D.O.          Again, thank you for allowing me to participate in the care of your patient.        Sincerely,        Dane Perez, DO

## 2017-09-07 NOTE — NURSING NOTE
"Chief Complaint   Patient presents with     RECHECK     right minimally displaced Radial head fracture, Right ulnar-sided Hand pain-probable contusion- DOI: 7/27/17       Initial Temp 97.4  F (36.3  C) (Temporal)  Ht 1.708 m (5' 7.25\")  Wt (!) 163.5 kg (360 lb 8 oz)  BMI 56.04 kg/m2 Estimated body mass index is 56.04 kg/(m^2) as calculated from the following:    Height as of this encounter: 1.708 m (5' 7.25\").    Weight as of this encounter: 163.5 kg (360 lb 8 oz).  Medication Reconciliation: complete   Steph/SUKHI     "

## 2017-09-07 NOTE — MR AVS SNAPSHOT
"              After Visit Summary   2017    Antonio Ravi    MRN: [id number]           Patient Information     Date Of Birth          1965        Visit Information        Provider Department      2017 8:00 AM Dane Perez,  [facility]        Today's Diagnoses     Closed displaced fracture of head of right radius with routine healing, subsequent encounter    -  1       Follow-ups after your visit        Who to contact     If you have questions or need follow up information about today's clinic visit or your schedule please contact [facility] directly at [phone].  Normal or non-critical lab and imaging results will be communicated to you by [employer], letter or phone within 4 business days after the clinic has received the results. If you do not hear from us within 7 days, please contact the clinic through [employer] or phone. If you have a critical or abnormal lab result, we will notify you by phone as soon as possible.  Submit refill requests through [employer] or call your pharmacy and they will forward the refill request to us. Please allow 3 business days for your refill to be completed.          Additional Information About Your Visit        MyChart Information     [employer] lets you send messages to your doctor, view your test results, renew your prescriptions, schedule appointments and more. To sign up, go to [email or web address] . Click on \"Log in\" on the left side of the screen, which will take you to the Welcome page. Then click on \"Sign up Now\" on the right side of the page.     You will be asked to enter the access code listed below, as well as some personal information. Please follow the directions to create your username and password.     Your access code is: [email or web address]  Expires: 10/26/2017  1:14 AM     Your access code will  in 90 days. If you need help or a new code, please call your [facility] or [phone].        Care " "EveryWhere ID     This is your Care EveryWhere ID. This could be used by other organizations to access your [facility] medical records  [id number]        Your Vitals Were     Temperature Height BMI (Body Mass Index)             97.4  F (36.3  C) (Temporal) 1.708 m (5' 7.25\") 56.04 kg/m2          Blood Pressure from Last 3 Encounters:   07/28/17 (!) 138/109   08/23/16 (!) 148/100   08/31/15 116/72    Weight from Last 3 Encounters:   09/07/17 (!) 163.5 kg (360 lb 8 oz)   08/10/17 (!) 167.4 kg (369 lb)   08/03/17 (!) 167.6 kg (369 lb 8 oz)               Primary Care Provider    Physician No Ref-Primary       No address on file        Equal Access to Services     VANESSA SHARP : Carlin brown Sojoseline, waaxda luqadaha, qaybta kaalmada luly, amos wilson . So [facility] [phone].    ATENCIÓN: Si habla español, tiene a sampson disposición servicios gratuitos de asistencia lingüística. Llame al [phone].    We comply with applicable federal civil rights laws and [place] laws. We do not discriminate on the basis of race, color, national origin, age, disability sex, sexual orientation or gender identity.            Thank you!     Thank you for choosing [facility]  for your care. Our goal is always to provide you with excellent care. Hearing back from our patients is one way we can continue to improve our services. Please take a few minutes to complete the written survey that you may receive in the mail after your visit with us. Thank you!             Your Updated Medication List - Protect others around you: Learn how to safely use, store and throw away your medicines at www.disposemymeds.org.      Notice  As of 9/7/2017  8:14 AM    You have not been prescribed any medications.      "

## 2017-09-07 NOTE — PROGRESS NOTES
Office Visit-Follow up    Chief Complaint: Antonio Ravi is a 51 year old male who is being seen for   Chief Complaint   Patient presents with     RECHECK     right minimally displaced Radial head fracture, Right ulnar-sided Hand pain-probable contusion- DOI: 7/27/17       History of Present Illness:   Today's visit  At previous visit was recommended for formal OT to continue to work on ROM but no lifting/pushing/pulling.  Since the last visit: he states overall the pain is better.  He has been trying very light  strength type exercise at home and states that does cause some pain.  He notes that with supination and pronation type activities will cause clicking and pain.  Has been using sling, has been following weight restrictions is working with no pulling pushing lifting. No numbness/tingling no pain to hand.  Overall feels improving.       8/10/17 visit  Patient states he feels like he is improving on motion, pain overall is mildly improved but he states some activities like range of motion do worsen the the pain.  He has been using the sling, doing gentle AROM to the elbow, ice, elevating and occasional APAP.  He states the numbness and tingling to his finger tips is about the same and mostly in the index, middle, ring, finger.  Patient states hand pain and swelling is improved.  Overall mildly better, still sore and feels stiff in elbow.  Deep ache in the elbow with occasional sharp pain.  No new symptoms or problems.      He does note he can do ibuprofen but did have a kidney problem once with blood in his urine that they were concerned about his kidney but never followed up.  No previous bleeding ulcers or kidney failure history.      Visit from 8/3/17:  Right handed male.   Patient seen in ED on 7/27/17 after a fall and right elbow and forearm pain.  Patient was found to have proximal radial head fracture.  Patient was placed in a sling and recommended to follow up with orthopedics. Patient states  "since the fall the swelling in his hand and elbow will go down with elevation but once in the sling or with working the swelling increases again.  He states the elbow pain is about the same as it was 7 days ago after the fall but states the pain in his hand along the pinky finger is worsening.  He also states the global hand numbness is about the same, but can feel just feels more numb than the left hand.  Patient states initially hand was not bad and did not bring up the hand pain to the ED doctor but the hand pain has been progressively worse.   Mechanism of Injury: A direct strike to the affected area.  Pain resulted ground level fall. Tripped over toys fell onto outstretched right arm  Location: right elbow (global elbow), hand along pinky finger  Duration of Pain:  7 days since fall  Rating of Pain:  Moderate at times, severe at times.    Pain Quality: sharp and pressure  Pain is better with: Rest and elevation along with Norco  Pain is worse with:  working, being in the sling, not elevating arm  Treatment so far consists of:  rest, Ice, Norco, sling.   Associated Features: Swelling, Numbness:  entire hand  Prior history of related problems:   No previous problem in the affected area.  Pain is limiting work. Pain is limiting normal activities of daily living. Pain is waking at night. The patient has pain at rest.  Here for Orthopedic consultation.  Pain is about same to elbow worse to hand.         REVIEW OF SYSTEMS  General: negative for, night sweats, dizziness, fatigue  Resp: No shortness of breath and no cough  CV: negative for chest pain, syncope or near-syncope  GI: negative for nausea, vomiting and diarrhea  : negative for dysuria and hematuria  Musculoskeletal: as above  Neurologic: negative for syncope   Hematologic: negative for bleeding disorder    Physical Exam:  Vitals: Temp 97.4  F (36.3  C) (Temporal)  Ht 1.708 m (5' 7.25\")  Wt (!) 163.5 kg (360 lb 8 oz)  BMI 56.04 kg/m2  BMI= Body mass " "index is 56.04 kg/(m^2).  Constitutional: healthy, alert and no acute distress   Psychiatric: mentation appears normal and affect normal/bright  NEURO: no focal deficits  RESP: Normal with easy respirations and no use of accessory muscles to breathe, no audible wheezing or retractions  CV: RUE:  shoulder down to hand-  No edema/swelling to hand         Regular rate and rhythm by palpation  SKIN: No erythema, rashes, excoriation, or breakdown. No evidence of infection.   JOINT/EXTREMITIES:right Elbow Exam: Inspection: no swelling to elbow.   Tender: to global elbow, especially radial head  Non-tender: hand is now non-tender  Range of Motion. active range of motion 5-120 degrees ROM with soft endpoints.  This is equal to left arm. Full forearm rotation.  There is some stiffness present  Strength: limited by pain.   Full ROM,  strength to wrist/hand.    Distal neurovascular status intact on right hand  2+ radial pulse        Diagnostic Modalities:  right elbow X-ray reviewed and compared against previous: mildly displaced distal radius head fracture again seen, largely unchanged from previous  Independent visualization of the images was performed.      Impression:right minimally displaced Radial head fracture    Plan:  All of the above pertinent physical exam and imaging modalities findings was reviewed with Antonio.    Per patient if he were to start bearing weight with right arm at work it would be \"all or nothing\".  At this point I feel it would be ok to start slow increase on weightbearing with therapy but to remain no push, pull, lift on RUE.  Letter to work written.   Plan to continue to monitor the clicking.  Continue to work on ROM, therapy, and can start slow increase in weight bearing.    No more use of sling.     Work restrictions: no lift push pull      Return to clinic 3-4, week(s), or sooner as needed for changes.  Re-x-ray on return: Yes.    Scribed by:  Wesly Blum, APRN, CNP, DNP  9:03 " AM  9/7/2017    I attest I have seen and evaluated the patient.  I agree with above impression and plan.  Raymond Perez D.O.

## 2017-09-07 NOTE — LETTER
September 7, 2017      Antonio Ravi  [address] [address] [facility] [address] [facility] [address]        To Whom It May Concern:    nAtonio Ravi was seen in our clinic. He may return to work with the following: no push, pull or lift with Right upper extremity until follow up.     next appointment is 4 weeks.      Sincerely,        Dane Perez, DO

## 2017-10-06 ENCOUNTER — OFFICE VISIT (OUTPATIENT)
Dept: ORTHOPEDICS | Facility: CLINIC | Age: 52
End: 2017-10-06

## 2017-10-06 ENCOUNTER — RADIANT APPOINTMENT (OUTPATIENT)
Dept: GENERAL RADIOLOGY | Facility: CLINIC | Age: 52
End: 2017-10-06
Attending: ORTHOPAEDIC SURGERY

## 2017-10-06 VITALS — TEMPERATURE: 97.5 F | WEIGHT: 315 LBS | HEIGHT: 67 IN | BODY MASS INDEX: 49.44 KG/M2

## 2017-10-06 DIAGNOSIS — M77.11 LATERAL EPICONDYLITIS OF RIGHT ELBOW: ICD-10-CM

## 2017-10-06 DIAGNOSIS — S52.121D CLOSED DISPLACED FRACTURE OF HEAD OF RIGHT RADIUS WITH ROUTINE HEALING, SUBSEQUENT ENCOUNTER: ICD-10-CM

## 2017-10-06 DIAGNOSIS — S52.121D CLOSED DISPLACED FRACTURE OF HEAD OF RIGHT RADIUS WITH ROUTINE HEALING, SUBSEQUENT ENCOUNTER: Primary | ICD-10-CM

## 2017-10-06 PROCEDURE — 73080 X-RAY EXAM OF ELBOW: CPT | Mod: TC

## 2017-10-06 PROCEDURE — 99214 OFFICE O/P EST MOD 30 MIN: CPT | Performed by: ORTHOPAEDIC SURGERY

## 2017-10-06 RX ORDER — DICLOFENAC SODIUM 75 MG/1
75 TABLET, DELAYED RELEASE ORAL 2 TIMES DAILY PRN
Qty: 60 TABLET | Refills: 0 | Status: SHIPPED | OUTPATIENT
Start: 2017-10-06

## 2017-10-06 ASSESSMENT — PAIN SCALES - GENERAL: PAINLEVEL: MODERATE PAIN (5)

## 2017-10-06 NOTE — NURSING NOTE
"Chief Complaint   Patient presents with     RECHECK     right minimally displaced Radial head fracture- DOI: 7/27/17       Initial Temp 97.5  F (36.4  C) (Temporal)  Ht 1.708 m (5' 7.25\")  Wt (!) 165.8 kg (365 lb 8 oz)  BMI 56.82 kg/m2 Estimated body mass index is 56.82 kg/(m^2) as calculated from the following:    Height as of this encounter: 1.708 m (5' 7.25\").    Weight as of this encounter: 165.8 kg (365 lb 8 oz).  Medication Reconciliation: complete   Steph/SUKHI     "

## 2017-10-06 NOTE — LETTER
"    10/6/2017         RE: Antonio Ravi  [address] [facility] [address]        Dear Colleague,    Thank you for referring your patient, Antonio Ravi, to the [facility]. Please see a copy of my visit note below.    Office Visit-Follow up    Chief Complaint: Antonio Ravi is a 51 year old male who is being seen for   Chief Complaint   Patient presents with     RECHECK     right minimally displaced Radial head fracture- DOI: 7/27/17       History of Present Illness:   Today's visit  He has noticed some upper forearm pain over the last 3 weeks. Worsening. Worse with gripping. No new traumas or injuries. He has been using it more at work. \"Pulling palate jacks\"  September 7 2017 visit:  At previous visit was recommended for formal OT to continue to work on ROM but no lifting/pushing/pulling.  Since the last visit: he states overall the pain is better.  He has been trying very light  strength type exercise at home and states that does cause some pain.  He notes that with supination and pronation type activities will cause clicking and pain.  Has been using sling, has been following weight restrictions is working with no pulling pushing lifting. No numbness/tingling no pain to hand.  Overall feels improving.         8/10/17 visit  Patient states he feels like he is improving on motion, pain overall is mildly improved but he states some activities like range of motion do worsen the the pain.  He has been using the sling, doing gentle AROM to the elbow, ice, elevating and occasional APAP.  He states the numbness and tingling to his finger tips is about the same and mostly in the index, middle, ring, finger.  Patient states hand pain and swelling is improved.  Overall mildly better, still sore and feels stiff in elbow.  Deep ache in the elbow with occasional sharp pain.  No new symptoms or problems.       He does note he can do ibuprofen but did have a kidney problem once with blood " in his urine that they were concerned about his kidney but never followed up.  No previous bleeding ulcers or kidney failure history.       Visit from 8/3/17:  Right handed male.   Patient seen in ED on 7/27/17 after a fall and right elbow and forearm pain.  Patient was found to have proximal radial head fracture.  Patient was placed in a sling and recommended to follow up with orthopedics. Patient states since the fall the swelling in his hand and elbow will go down with elevation but once in the sling or with working the swelling increases again.  He states the elbow pain is about the same as it was 7 days ago after the fall but states the pain in his hand along the pinky finger is worsening.  He also states the global hand numbness is about the same, but can feel just feels more numb than the left hand.  Patient states initially hand was not bad and did not bring up the hand pain to the ED doctor but the hand pain has been progressively worse.   Mechanism of Injury: A direct strike to the affected area.  Pain resulted ground level fall. Tripped over toys fell onto outstretched right arm  Location: right elbow (global elbow), hand along pinky finger  Duration of Pain:  7 days since fall  Rating of Pain:  Moderate at times, severe at times.    Pain Quality: sharp and pressure  Pain is better with: Rest and elevation along with Norco  Pain is worse with:  working, being in the sling, not elevating arm  Treatment so far consists of:  rest, Ice, Norco, sling.   Associated Features: Swelling, Numbness:  entire hand  Prior history of related problems:   No previous problem in the affected area.  Pain is limiting work. Pain is limiting normal activities of daily living. Pain is waking at night. The patient has pain at rest.  Here for Orthopedic consultation.  Pain is about same to elbow worse to hand.      REVIEW OF SYSTEMS  General: negative for, night sweats, dizziness, fatigue  Resp: No shortness of breath and no  cough  CV: negative for chest pain, syncope or near-syncope  GI: negative for nausea, vomiting and diarrhea  : negative for dysuria and hematuria  Musculoskeletal: as above  Neurologic: negative for syncope   Hematologic: negative for bleeding disorder    Physical Exam:  Vitals: There were no vitals taken for this visit.  BMI= There is no height or weight on file to calculate BMI.  Constitutional: healthy, alert and no acute distress   Psychiatric: mentation appears normal and affect normal/bright  NEURO: no focal deficits  RESP: Normal with easy respirations and no use of accessory muscles to breathe, no audible wheezing or retractions  CV: RUE:  no edema         Regular rate and rhythm by palpation  SKIN: No erythema, rashes, excoriation, or breakdown. No evidence of infection.   JOINT/EXTREMITIES:right elbow: Active motion  . He has tenderness more through the common extensor muscle mass and lateral epicondyle. He has no radial head tenderness. There is a click with forearm rotation. Appears to be along the radial head. Increased pain with resisted wrist extension.  GAIT: not tested             Diagnostic Modalities:  right elbow X-ray: Minimally displaced radial head fracture. Same as previous. Approximate 2-3 mm step-off affecting 20% of the joint  Independent visualization of the images was performed.      Impression: right minimally displaced radial head fracture  Right lateral epicondylitis  Plan:  All of the above pertinent physical exam and imaging modalities findings was reviewed with Antonio.    Options reviewed. The bulk of his symptoms appear to be more associate with the lateral epicondylitis. I recommended occupational therapy. I also provided him a prescription for diclofenac. He needs to rest at work.      Return to clinic 4, week(s), or sooner as needed for changes.  Re-x-ray on return: No    Raymond Perez D.O.          Again, thank you for allowing me to participate in the care of your  patient.        Sincerely,        Dane Perez, DO

## 2017-10-06 NOTE — LETTER
October 6, 2017      Antonio Ravi  [address] [address] [address]        To Whom It May Concern:    Antonio Ravi was seen in our clinic. He may return to work with the following: no lift, push or pulling with Right upper extremity until follow up.          Sincerely,        Dane Perez, DO

## 2017-10-06 NOTE — MR AVS SNAPSHOT
"              After Visit Summary   10/6/2017    Antonio Ravi    MRN: [id number]           Patient Information     Date Of Birth          1965        Visit Information        Provider Department      10/6/2017 1:00 PM Dane Perez,  [facility]        Today's Diagnoses     Closed displaced fracture of head of right radius with routine healing, subsequent encounter    -  1       Follow-ups after your visit        Who to contact     If you have questions or need follow up information about today's clinic visit or your schedule please contact [facility] directly at [phone].  Normal or non-critical lab and imaging results will be communicated to you by Aradigmhart, letter or phone within 4 business days after the clinic has received the results. If you do not hear from us within 7 days, please contact the clinic through Aradigmhart or phone. If you have a critical or abnormal lab result, we will notify you by phone as soon as possible.  Submit refill requests through 1Lay or call your pharmacy and they will forward the refill request to us. Please allow 3 business days for your refill to be completed.          Additional Information About Your Visit        MyChart Information     1Lay lets you send messages to your doctor, view your test results, renew your prescriptions, schedule appointments and more. To sign up, go to [email or web address] . Click on \"Log in\" on the left side of the screen, which will take you to the Welcome page. Then click on \"Sign up Now\" on the right side of the page.     You will be asked to enter the access code listed below, as well as some personal information. Please follow the directions to create your username and password.     Your access code is: [email or web address]  Expires: 10/26/2017  1:14 AM     Your access code will  in 90 days. If you need help or a new code, please call your [facility] or [phone].        Care " "EveryWhere ID     This is your Care EveryWhere ID. This could be used by other organizations to access your [facility] medical records  [id number]        Your Vitals Were     Temperature Height BMI (Body Mass Index)             97.5  F (36.4  C) (Temporal) 1.708 m (5' 7.25\") 56.82 kg/m2          Blood Pressure from Last 3 Encounters:   07/28/17 (!) 138/109   08/23/16 (!) 148/100   08/31/15 116/72    Weight from Last 3 Encounters:   10/06/17 (!) 165.8 kg (365 lb 8 oz)   09/07/17 (!) 163.5 kg (360 lb 8 oz)   08/10/17 (!) 167.4 kg (369 lb)               Primary Care Provider Fax #    Physician No Ref-Primary [phone]       No address on file        Equal Access to Services     VANESSA SHARP : Carlin Cooper, waaxda javiadaha, qaybpaula kaalmaverito mauricio, amos wilson . So [facility] [phone].    ATENCIÓN: Si habla español, tiene a sampson disposición servicios gratuitos de asistencia lingüística. Llame al [phone].    We comply with applicable federal civil rights laws and [place] laws. We do not discriminate on the basis of race, color, national origin, age, disability, sex, sexual orientation, or gender identity.            Thank you!     Thank you for choosing [facility]  for your care. Our goal is always to provide you with excellent care. Hearing back from our patients is one way we can continue to improve our services. Please take a few minutes to complete the written survey that you may receive in the mail after your visit with us. Thank you!             Your Updated Medication List - Protect others around you: Learn how to safely use, store and throw away your medicines at www.disposemymeds.org.      Notice  As of 10/6/2017  1:07 PM    You have not been prescribed any medications.      "

## 2017-10-06 NOTE — PROGRESS NOTES
"Office Visit-Follow up    Chief Complaint: Antonio Ravi is a 51 year old male who is being seen for   Chief Complaint   Patient presents with     RECHECK     right minimally displaced Radial head fracture- DOI: 7/27/17       History of Present Illness:   Today's visit  He has noticed some upper forearm pain over the last 3 weeks. Worsening. Worse with gripping. No new traumas or injuries. He has been using it more at work. \"Pulling palate jacks\"  September 7 2017 visit:  At previous visit was recommended for formal OT to continue to work on ROM but no lifting/pushing/pulling.  Since the last visit: he states overall the pain is better.  He has been trying very light  strength type exercise at home and states that does cause some pain.  He notes that with supination and pronation type activities will cause clicking and pain.  Has been using sling, has been following weight restrictions is working with no pulling pushing lifting. No numbness/tingling no pain to hand.  Overall feels improving.         8/10/17 visit  Patient states he feels like he is improving on motion, pain overall is mildly improved but he states some activities like range of motion do worsen the the pain.  He has been using the sling, doing gentle AROM to the elbow, ice, elevating and occasional APAP.  He states the numbness and tingling to his finger tips is about the same and mostly in the index, middle, ring, finger.  Patient states hand pain and swelling is improved.  Overall mildly better, still sore and feels stiff in elbow.  Deep ache in the elbow with occasional sharp pain.  No new symptoms or problems.       He does note he can do ibuprofen but did have a kidney problem once with blood in his urine that they were concerned about his kidney but never followed up.  No previous bleeding ulcers or kidney failure history.       Visit from 8/3/17:  Right handed male.   Patient seen in ED on 7/27/17 after a fall and right elbow and " forearm pain.  Patient was found to have proximal radial head fracture.  Patient was placed in a sling and recommended to follow up with orthopedics. Patient states since the fall the swelling in his hand and elbow will go down with elevation but once in the sling or with working the swelling increases again.  He states the elbow pain is about the same as it was 7 days ago after the fall but states the pain in his hand along the pinky finger is worsening.  He also states the global hand numbness is about the same, but can feel just feels more numb than the left hand.  Patient states initially hand was not bad and did not bring up the hand pain to the ED doctor but the hand pain has been progressively worse.   Mechanism of Injury: A direct strike to the affected area.  Pain resulted ground level fall. Tripped over toys fell onto outstretched right arm  Location: right elbow (global elbow), hand along pinky finger  Duration of Pain:  7 days since fall  Rating of Pain:  Moderate at times, severe at times.    Pain Quality: sharp and pressure  Pain is better with: Rest and elevation along with Norco  Pain is worse with:  working, being in the sling, not elevating arm  Treatment so far consists of:  rest, Ice, Norco, sling.   Associated Features: Swelling, Numbness:  entire hand  Prior history of related problems:   No previous problem in the affected area.  Pain is limiting work. Pain is limiting normal activities of daily living. Pain is waking at night. The patient has pain at rest.  Here for Orthopedic consultation.  Pain is about same to elbow worse to hand.      REVIEW OF SYSTEMS  General: negative for, night sweats, dizziness, fatigue  Resp: No shortness of breath and no cough  CV: negative for chest pain, syncope or near-syncope  GI: negative for nausea, vomiting and diarrhea  : negative for dysuria and hematuria  Musculoskeletal: as above  Neurologic: negative for syncope   Hematologic: negative for bleeding  disorder    Physical Exam:  Vitals: There were no vitals taken for this visit.  BMI= There is no height or weight on file to calculate BMI.  Constitutional: healthy, alert and no acute distress   Psychiatric: mentation appears normal and affect normal/bright  NEURO: no focal deficits  RESP: Normal with easy respirations and no use of accessory muscles to breathe, no audible wheezing or retractions  CV: RUE:  no edema         Regular rate and rhythm by palpation  SKIN: No erythema, rashes, excoriation, or breakdown. No evidence of infection.   JOINT/EXTREMITIES:right elbow: Active motion  . He has tenderness more through the common extensor muscle mass and lateral epicondyle. He has no radial head tenderness. There is a click with forearm rotation. Appears to be along the radial head. Increased pain with resisted wrist extension.  GAIT: not tested             Diagnostic Modalities:  right elbow X-ray: Minimally displaced radial head fracture. Same as previous. Approximate 2-3 mm step-off affecting 20% of the joint  Independent visualization of the images was performed.      Impression: right minimally displaced radial head fracture  Right lateral epicondylitis  Plan:  All of the above pertinent physical exam and imaging modalities findings was reviewed with Antonio.    Options reviewed. The bulk of his symptoms appear to be more associate with the lateral epicondylitis. I recommended occupational therapy. I also provided him a prescription for diclofenac. He needs to rest at work.      Return to clinic 4, week(s), or sooner as needed for changes.  Re-x-ray on return: No    Raymond Perez D.O.

## 2017-11-09 ENCOUNTER — OFFICE VISIT (OUTPATIENT)
Dept: ORTHOPEDICS | Facility: CLINIC | Age: 52
End: 2017-11-09

## 2017-11-09 VITALS — HEIGHT: 67 IN | TEMPERATURE: 97.7 F | BODY MASS INDEX: 49.44 KG/M2 | WEIGHT: 315 LBS

## 2017-11-09 DIAGNOSIS — S52.121D CLOSED DISPLACED FRACTURE OF HEAD OF RIGHT RADIUS WITH ROUTINE HEALING, SUBSEQUENT ENCOUNTER: Primary | ICD-10-CM

## 2017-11-09 DIAGNOSIS — M77.11 LATERAL EPICONDYLITIS OF RIGHT ELBOW: ICD-10-CM

## 2017-11-09 PROCEDURE — 99213 OFFICE O/P EST LOW 20 MIN: CPT | Performed by: ORTHOPAEDIC SURGERY

## 2017-11-09 ASSESSMENT — PAIN SCALES - GENERAL: PAINLEVEL: MODERATE PAIN (5)

## 2017-11-09 NOTE — LETTER
"    11/9/2017         RE: Antonio Ravi  [address] [facility] [address]        Dear Colleague,    Thank you for referring your patient, Antonio Ravi, to the [facility]. Please see a copy of my visit note below.    Office Visit-Follow up    Chief Complaint: Antonio Ravi is a 52 year old male who is being seen for   Chief Complaint   Patient presents with     RECHECK     right minimally displaced radial head fracture, Right lateral epicondylitis- DOI: 7/27/17       History of Present Illness:   Today's visit  Returns reevaluation right elbow. Complains of a worse pain when he is pushing with his wrist extended and gripping. Pain is located over the lateral epicondyle and common extensor area. Voltaren has not been helpful.  October 6, 2017 visit:  He has noticed some upper forearm pain over the last 3 weeks. Worsening. Worse with gripping. No new traumas or injuries. He has been using it more at work. \"Pulling palate jacks\"  September 7 2017 visit:  At previous visit was recommended for formal OT to continue to work on ROM but no lifting/pushing/pulling.  Since the last visit: he states overall the pain is better.  He has been trying very light  strength type exercise at home and states that does cause some pain.  He notes that with supination and pronation type activities will cause clicking and pain.  Has been using sling, has been following weight restrictions is working with no pulling pushing lifting. No numbness/tingling no pain to hand.  Overall feels improving.           8/10/17 visit  Patient states he feels like he is improving on motion, pain overall is mildly improved but he states some activities like range of motion do worsen the the pain.  He has been using the sling, doing gentle AROM to the elbow, ice, elevating and occasional APAP.  He states the numbness and tingling to his finger tips is about the same and mostly in the index, middle, ring, finger. "  Patient states hand pain and swelling is improved.  Overall mildly better, still sore and feels stiff in elbow.  Deep ache in the elbow with occasional sharp pain.  No new symptoms or problems.       He does note he can do ibuprofen but did have a kidney problem once with blood in his urine that they were concerned about his kidney but never followed up.  No previous bleeding ulcers or kidney failure history.       Visit from 8/3/17:  Right handed male.   Patient seen in ED on 7/27/17 after a fall and right elbow and forearm pain.  Patient was found to have proximal radial head fracture.  Patient was placed in a sling and recommended to follow up with orthopedics. Patient states since the fall the swelling in his hand and elbow will go down with elevation but once in the sling or with working the swelling increases again.  He states the elbow pain is about the same as it was 7 days ago after the fall but states the pain in his hand along the pinky finger is worsening.  He also states the global hand numbness is about the same, but can feel just feels more numb than the left hand.  Patient states initially hand was not bad and did not bring up the hand pain to the ED doctor but the hand pain has been progressively worse.   Mechanism of Injury: A direct strike to the affected area.  Pain resulted ground level fall. Tripped over toys fell onto outstretched right arm  Location: right elbow (global elbow), hand along pinky finger  Duration of Pain:  7 days since fall  Rating of Pain:  Moderate at times, severe at times.    Pain Quality: sharp and pressure  Pain is better with: Rest and elevation along with Norco  Pain is worse with:  working, being in the sling, not elevating arm  Treatment so far consists of:  rest, Ice, Norco, sling.   Associated Features: Swelling, Numbness:  entire hand  Prior history of related problems:   No previous problem in the affected area.  Pain is limiting work. Pain is limiting normal  "activities of daily living. Pain is waking at night. The patient has pain at rest.  Here for Orthopedic consultation.  Pain is about same to elbow worse to hand.         REVIEW OF SYSTEMS  General: negative for, night sweats, dizziness, fatigue  Resp: No shortness of breath and no cough  CV: negative for chest pain, syncope or near-syncope  GI: negative for nausea, vomiting and diarrhea  : negative for dysuria and hematuria  Musculoskeletal: as above  Neurologic: negative for syncope   Hematologic: negative for bleeding disorder    Physical Exam:  Vitals: Temp 97.7  F (36.5  C) (Temporal)  Ht 5' 7.25\" (1.708 m)  Wt (!) 362 lb (164.2 kg)  BMI 56.28 kg/m2  BMI= Body mass index is 56.28 kg/(m^2).  Constitutional: healthy, alert and no acute distress   Psychiatric: mentation appears normal and affect normal/bright  NEURO: no focal deficits  RESP: Normal with easy respirations and no use of accessory muscles to breathe, no audible wheezing or retractions  CV: RUE:  no edema         SKIN: No erythema, rashes, excoriation, or breakdown. No evidence of infection.   JOINT/EXTREMITIES:right elbow: Some tightness with terminal extension of the elbow. Very minimal tenderness over the radial head. More focal tenderness over the common extensor and lateral condyle. Increased pain with resisted wrist extension. Distal neurovascular intact  GAIT: not tested             Diagnostic Modalities:  None today.  Independent visualization of the images was performed.      Impression: right minimally displaced radial head fracture-healing  Right lateral epicondylitis    Plan:  All of the above pertinent physical exam and imaging modalities findings was reviewed with Antonio.    I discussed his symptoms. It's more consistent with his lateral condyle pain. This is correlated to pain with gripping and pushing. Given his trauma with continued pain refractory to conservative care of recommended MRI for full evaluation of his common extensor " tendon.      Return to clinic to discuss test results, or sooner as needed for changes.  Re-x-ray on return: No    Raymond Perez D.O.          Again, thank you for allowing me to participate in the care of your patient.        Sincerely,        Dane Perez, DO

## 2017-11-09 NOTE — PROGRESS NOTES
"Office Visit-Follow up    Chief Complaint: Antonio Ravi is a 52 year old male who is being seen for   Chief Complaint   Patient presents with     RECHECK     right minimally displaced radial head fracture, Right lateral epicondylitis- DOI: 7/27/17       History of Present Illness:   Today's visit  Returns reevaluation right elbow. Complains of a worse pain when he is pushing with his wrist extended and gripping. Pain is located over the lateral epicondyle and common extensor area. Voltaren has not been helpful.  October 6, 2017 visit:  He has noticed some upper forearm pain over the last 3 weeks. Worsening. Worse with gripping. No new traumas or injuries. He has been using it more at work. \"Pulling palate jacks\"  September 7 2017 visit:  At previous visit was recommended for formal OT to continue to work on ROM but no lifting/pushing/pulling.  Since the last visit: he states overall the pain is better.  He has been trying very light  strength type exercise at home and states that does cause some pain.  He notes that with supination and pronation type activities will cause clicking and pain.  Has been using sling, has been following weight restrictions is working with no pulling pushing lifting. No numbness/tingling no pain to hand.  Overall feels improving.           8/10/17 visit  Patient states he feels like he is improving on motion, pain overall is mildly improved but he states some activities like range of motion do worsen the the pain.  He has been using the sling, doing gentle AROM to the elbow, ice, elevating and occasional APAP.  He states the numbness and tingling to his finger tips is about the same and mostly in the index, middle, ring, finger.  Patient states hand pain and swelling is improved.  Overall mildly better, still sore and feels stiff in elbow.  Deep ache in the elbow with occasional sharp pain.  No new symptoms or problems.       He does note he can do ibuprofen but did have a kidney " problem once with blood in his urine that they were concerned about his kidney but never followed up.  No previous bleeding ulcers or kidney failure history.       Visit from 8/3/17:  Right handed male.   Patient seen in ED on 7/27/17 after a fall and right elbow and forearm pain.  Patient was found to have proximal radial head fracture.  Patient was placed in a sling and recommended to follow up with orthopedics. Patient states since the fall the swelling in his hand and elbow will go down with elevation but once in the sling or with working the swelling increases again.  He states the elbow pain is about the same as it was 7 days ago after the fall but states the pain in his hand along the pinky finger is worsening.  He also states the global hand numbness is about the same, but can feel just feels more numb than the left hand.  Patient states initially hand was not bad and did not bring up the hand pain to the ED doctor but the hand pain has been progressively worse.   Mechanism of Injury: A direct strike to the affected area.  Pain resulted ground level fall. Tripped over toys fell onto outstretched right arm  Location: right elbow (global elbow), hand along pinky finger  Duration of Pain:  7 days since fall  Rating of Pain:  Moderate at times, severe at times.    Pain Quality: sharp and pressure  Pain is better with: Rest and elevation along with Norco  Pain is worse with:  working, being in the sling, not elevating arm  Treatment so far consists of:  rest, Ice, Norco, sling.   Associated Features: Swelling, Numbness:  entire hand  Prior history of related problems:   No previous problem in the affected area.  Pain is limiting work. Pain is limiting normal activities of daily living. Pain is waking at night. The patient has pain at rest.  Here for Orthopedic consultation.  Pain is about same to elbow worse to hand.         REVIEW OF SYSTEMS  General: negative for, night sweats, dizziness, fatigue  Resp: No  "shortness of breath and no cough  CV: negative for chest pain, syncope or near-syncope  GI: negative for nausea, vomiting and diarrhea  : negative for dysuria and hematuria  Musculoskeletal: as above  Neurologic: negative for syncope   Hematologic: negative for bleeding disorder    Physical Exam:  Vitals: Temp 97.7  F (36.5  C) (Temporal)  Ht 5' 7.25\" (1.708 m)  Wt (!) 362 lb (164.2 kg)  BMI 56.28 kg/m2  BMI= Body mass index is 56.28 kg/(m^2).  Constitutional: healthy, alert and no acute distress   Psychiatric: mentation appears normal and affect normal/bright  NEURO: no focal deficits  RESP: Normal with easy respirations and no use of accessory muscles to breathe, no audible wheezing or retractions  CV: RUE:  no edema         SKIN: No erythema, rashes, excoriation, or breakdown. No evidence of infection.   JOINT/EXTREMITIES:right elbow: Some tightness with terminal extension of the elbow. Very minimal tenderness over the radial head. More focal tenderness over the common extensor and lateral condyle. Increased pain with resisted wrist extension. Distal neurovascular intact  GAIT: not tested             Diagnostic Modalities:  None today.  Independent visualization of the images was performed.      Impression: right minimally displaced radial head fracture-healing  Right lateral epicondylitis    Plan:  All of the above pertinent physical exam and imaging modalities findings was reviewed with Antonio.    I discussed his symptoms. It's more consistent with his lateral condyle pain. This is correlated to pain with gripping and pushing. Given his trauma with continued pain refractory to conservative care of recommended MRI for full evaluation of his common extensor tendon.      Return to clinic to discuss test results, or sooner as needed for changes.  Re-x-ray on return: No    Raymond Perez D.O.        "

## 2017-11-09 NOTE — LETTER
11/9/2017       Antonio Ravi  [address] [address] [place] [address]        To Whom It May Concern:    Antonio Ravi was seen in our clinic. He may return to work with the following: no lift, push or pulling with Right upper extremity until follow up.          Sincerely,        Dane Perez, DO

## 2017-11-09 NOTE — MR AVS SNAPSHOT
"              After Visit Summary   11/9/2017    Antonio Ravi    MRN: [id number]           Patient Information     Date Of Birth          1965        Visit Information        Provider Department      11/9/2017 8:30 AM Dane Perez DO [facility]         Follow-ups after your visit        Your next 10 appointments already scheduled     Nov 09, 2017  8:30 AM CST   Return Visit with Dane Perez DO   [facility]    [address] [place] [address] [id number] [phone]              Who to contact     If you have questions or need follow up information about today's clinic visit or your schedule please contact [facility] directly at [phone].  Normal or non-critical lab and imaging results will be communicated to you by [employer], letter or phone within 4 business days after the clinic has received the results. If you do not hear from us within 7 days, please contact the clinic through MyChart or phone. If you have a critical or abnormal lab result, we will notify you by phone as soon as possible.  Submit refill requests through [employer] or call your pharmacy and they will forward the refill request to us. Please allow 3 business days for your refill to be completed.          Additional Information About Your Visit        [employer]harDelaGet Information     [employer] lets you send messages to your doctor, view your test results, renew your prescriptions, schedule appointments and more. To sign up, go to [email or web address] . Click on \"Log in\" on the left side of the screen, which will take you to the Welcome page. Then click on \"Sign up Now\" on the right side of the page.     You will be asked to enter the access code listed below, as well as some personal information. Please follow the directions to create your username and password.     Your access code is: [email or web address]  Expires: 2/7/2018  8:29 AM     Your access " "code will  in 90 days. If you need help or a new code, please call your [place] clinic or [phone].        Care EveryWhere ID     This is your Care EveryWhere ID. This could be used by other organizations to access your [place] medical records  [id number]        Your Vitals Were     Temperature Height BMI (Body Mass Index)             97.7  F (36.5  C) (Temporal) 1.708 m (5' 7.25\") 56.28 kg/m2          Blood Pressure from Last 3 Encounters:   17 (!) 138/109   16 (!) 148/100   08/31/15 116/72    Weight from Last 3 Encounters:   17 (!) 164.2 kg (362 lb)   10/06/17 (!) 165.8 kg (365 lb 8 oz)   17 (!) 163.5 kg (360 lb 8 oz)              Today, you had the following     No orders found for display       Primary Care Provider    Physician No Ref-Primary       NO REF-PRIMARY PHYSICIAN        Equal Access to Services     NEGAR [facility] MeridianJENNIFER : Hadii aad ku hadasho Soomaali, waaxda luqadaha, qaybta kaalmada adeegyada, waxay idiin haygavinn zelalem wilson . So [facility] [phone].    ATENCIÓN: Si habla español, tiene a sampson disposición servicios gratuitos de asistencia lingüística. Llame al [phone].    We comply with applicable federal civil rights laws and [place] laws. We do not discriminate on the basis of race, color, national origin, age, disability, sex, sexual orientation, or gender identity.            Thank you!     Thank you for choosing [facility]  for your care. Our goal is always to provide you with excellent care. Hearing back from our patients is one way we can continue to improve our services. Please take a few minutes to complete the written survey that you may receive in the mail after your visit with us. Thank you!             Your Updated Medication List - Protect others around you: Learn how to safely use, store and throw away your medicines at www.disposemymeds.org.          This list is accurate as of: 17  8:29 AM.  Always use your most recent " med list.                   Brand Name Dispense Instructions for use Diagnosis    diclofenac 75 MG EC tablet    VOLTAREN    60 tablet    Take 1 tablet (75 mg) by mouth 2 times daily as needed for moderate pain    Lateral epicondylitis of right elbow

## 2017-11-09 NOTE — NURSING NOTE
"Chief Complaint   Patient presents with     RECHECK     right minimally displaced radial head fracture, Right lateral epicondylitis- DOI: 7/27/17       Initial Temp 97.7  F (36.5  C) (Temporal)  Ht 1.708 m (5' 7.25\")  Wt (!) 164.2 kg (362 lb)  BMI 56.28 kg/m2 Estimated body mass index is 56.28 kg/(m^2) as calculated from the following:    Height as of this encounter: 1.708 m (5' 7.25\").    Weight as of this encounter: 164.2 kg (362 lb).  Medication Reconciliation: complete   Steph/SUKHI     "

## 2017-11-13 ENCOUNTER — HOSPITAL ENCOUNTER (OUTPATIENT)
Dept: MRI IMAGING | Facility: CLINIC | Age: 52
Discharge: HOME | End: 2017-11-13
Attending: ORTHOPAEDIC SURGERY | Admitting: ORTHOPAEDIC SURGERY

## 2017-11-13 DIAGNOSIS — M77.11 LATERAL EPICONDYLITIS OF RIGHT ELBOW: ICD-10-CM

## 2017-11-13 PROCEDURE — 73221 MRI JOINT UPR EXTREM W/O DYE: CPT | Mod: RT

## 2017-11-16 ENCOUNTER — OFFICE VISIT (OUTPATIENT)
Dept: ORTHOPEDICS | Facility: CLINIC | Age: 52
End: 2017-11-16

## 2017-11-16 VITALS — HEIGHT: 67 IN | TEMPERATURE: 97.6 F | BODY MASS INDEX: 49.44 KG/M2 | WEIGHT: 315 LBS

## 2017-11-16 DIAGNOSIS — M77.11 LATERAL EPICONDYLITIS OF RIGHT ELBOW: ICD-10-CM

## 2017-11-16 DIAGNOSIS — S52.121D CLOSED DISPLACED FRACTURE OF HEAD OF RIGHT RADIUS WITH ROUTINE HEALING, SUBSEQUENT ENCOUNTER: Primary | ICD-10-CM

## 2017-11-16 PROCEDURE — 99213 OFFICE O/P EST LOW 20 MIN: CPT | Mod: 25 | Performed by: ORTHOPAEDIC SURGERY

## 2017-11-16 PROCEDURE — 20551 NJX 1 TENDON ORIGIN/INSJ: CPT | Mod: RT | Performed by: ORTHOPAEDIC SURGERY

## 2017-11-16 RX ORDER — BETAMETHASONE SODIUM PHOSPHATE AND BETAMETHASONE ACETATE 3; 3 MG/ML; MG/ML
6 INJECTION, SUSPENSION INTRA-ARTICULAR; INTRALESIONAL; INTRAMUSCULAR; SOFT TISSUE ONCE
Qty: 1 ML | Refills: 0
Start: 2017-11-16 | End: 2017-12-07

## 2017-11-16 ASSESSMENT — PAIN SCALES - GENERAL: PAINLEVEL: MODERATE PAIN (5)

## 2017-11-16 NOTE — MR AVS SNAPSHOT
"              After Visit Summary   11/16/2017    Antonio Ravi    MRN: [id number]           Patient Information     Date Of Birth          1965        Visit Information        Provider Department      11/16/2017 8:30 AM Dane Perez DO [facility]         Follow-ups after your visit        Your next 10 appointments already scheduled     Nov 16, 2017  8:30 AM CST   Return Visit with Dane Perez DO   [facility] ([facility]    [address] [place] [address] [id number] [phone]              Who to contact     If you have questions or need follow up information about today's clinic visit or your schedule please contact [facility] directly at [phone].  Normal or non-critical lab and imaging results will be communicated to you by [employer], letter or phone within 4 business days after the clinic has received the results. If you do not hear from us within 7 days, please contact the clinic through MyChart or phone. If you have a critical or abnormal lab result, we will notify you by phone as soon as possible.  Submit refill requests through [employer] or call your pharmacy and they will forward the refill request to us. Please allow 3 business days for your refill to be completed.          Additional Information About Your Visit        MyChart Information     [employer] lets you send messages to your doctor, view your test results, renew your prescriptions, schedule appointments and more. To sign up, go to [email or web address] . Click on \"Log in\" on the left side of the screen, which will take you to the Welcome page. Then click on \"Sign up Now\" on the right side of the page.     You will be asked to enter the access code listed below, as well as some personal information. Please follow the directions to create your username and password.     Your access code is: [email or web address]  Expires: 2/7/2018  8:29 AM     Your " "access code will  in 90 days. If you need help or a new code, please call your [place] clinic or [phone].        Care EveryWhere ID     This is your Care EveryWhere ID. This could be used by other organizations to access your [place] medical records  [id number]        Your Vitals Were     Temperature Height BMI (Body Mass Index)             97.6  F (36.4  C) (Temporal) 1.708 m (5' 7.25\") 56.28 kg/m2          Blood Pressure from Last 3 Encounters:   17 (!) 138/109   16 (!) 148/100   08/31/15 116/72    Weight from Last 3 Encounters:   17 (!) 164.2 kg (362 lb)   17 (!) 164.2 kg (362 lb)   10/06/17 (!) 165.8 kg (365 lb 8 oz)              Today, you had the following     No orders found for display       Primary Care Provider    Physician No Ref-Primary       NO REF-PRIMARY PHYSICIAN        Equal Access to Services     NEGAR SHARP : Hadii aad ku hadasho Soomaali, waaxda luqadaha, qaybta kaalmada adeegyada, waxay hannyin jyothi wilson . So [facility] [phone].    ATENCIÓN: Si habla español, tiene a sampson disposición servicios gratuitos de asistencia lingüística. Llame al [phone].    We comply with applicable federal civil rights laws and [place] laws. We do not discriminate on the basis of race, color, national origin, age, disability, sex, sexual orientation, or gender identity.            Thank you!     Thank you for choosing [facility]  for your care. Our goal is always to provide you with excellent care. Hearing back from our patients is one way we can continue to improve our services. Please take a few minutes to complete the written survey that you may receive in the mail after your visit with us. Thank you!             Your Updated Medication List - Protect others around you: Learn how to safely use, store and throw away your medicines at www.disposemymeds.org.          This list is accurate as of: 17  8:27 AM.  Always use your most recent " med list.                   Brand Name Dispense Instructions for use Diagnosis    diclofenac 75 MG EC tablet    VOLTAREN    60 tablet    Take 1 tablet (75 mg) by mouth 2 times daily as needed for moderate pain    Lateral epicondylitis of right elbow

## 2017-11-16 NOTE — LETTER
11/16/2017        Antonio Ravi  [address] [address] [id number]        To Whom It May Concern:    Antonio Ravi was seen in our clinic. He may return to work with the following: no lift, push or pulling with Right upper extremity until follow up.          Sincerely,        Dane Perez, DO

## 2017-11-16 NOTE — LETTER
"    11/16/2017         RE: Antonio Ravi  [address] [address] [address]        Dear Colleague,    Thank you for referring your patient, Antonio Ravi, to the [facility]. Please see a copy of my visit note below.    Office Visit-Follow up    Chief Complaint: Antonio Ravi is a 52 year old male who is being seen for   Chief Complaint   Patient presents with     RECHECK     MRI results of right elbow       History of Present Illness:   Today's visit  Returns for MRI results. Pain is unchanged. Worse with gripping.  November 9, 2017 visit:  Returns reevaluation right elbow. Complains of a worse pain when he is pushing with his wrist extended and gripping. Pain is located over the lateral epicondyle and common extensor area. Voltaren has not been helpful.  October 6, 2017 visit:  He has noticed some upper forearm pain over the last 3 weeks. Worsening. Worse with gripping. No new traumas or injuries. He has been using it more at work. \"Pulling palate jacks\"  September 7 2017 visit:  At previous visit was recommended for formal OT to continue to work on ROM but no lifting/pushing/pulling.  Since the last visit: he states overall the pain is better.  He has been trying very light  strength type exercise at home and states that does cause some pain.  He notes that with supination and pronation type activities will cause clicking and pain.  Has been using sling, has been following weight restrictions is working with no pulling pushing lifting. No numbness/tingling no pain to hand.  Overall feels improving.           8/10/17 visit  Patient states he feels like he is improving on motion, pain overall is mildly improved but he states some activities like range of motion do worsen the the pain.  He has been using the sling, doing gentle AROM to the elbow, ice, elevating and occasional APAP.  He states the numbness and tingling to his finger tips is about the same and mostly in the index, " middle, ring, finger.  Patient states hand pain and swelling is improved.  Overall mildly better, still sore and feels stiff in elbow.  Deep ache in the elbow with occasional sharp pain.  No new symptoms or problems.       He does note he can do ibuprofen but did have a kidney problem once with blood in his urine that they were concerned about his kidney but never followed up.  No previous bleeding ulcers or kidney failure history.       Visit from 8/3/17:  Right handed male.   Patient seen in ED on 7/27/17 after a fall and right elbow and forearm pain.  Patient was found to have proximal radial head fracture.  Patient was placed in a sling and recommended to follow up with orthopedics. Patient states since the fall the swelling in his hand and elbow will go down with elevation but once in the sling or with working the swelling increases again.  He states the elbow pain is about the same as it was 7 days ago after the fall but states the pain in his hand along the pinky finger is worsening.  He also states the global hand numbness is about the same, but can feel just feels more numb than the left hand.  Patient states initially hand was not bad and did not bring up the hand pain to the ED doctor but the hand pain has been progressively worse.   Mechanism of Injury: A direct strike to the affected area.  Pain resulted ground level fall. Tripped over toys fell onto outstretched right arm  Location: right elbow (global elbow), hand along pinky finger  Duration of Pain:  7 days since fall  Rating of Pain:  Moderate at times, severe at times.    Pain Quality: sharp and pressure  Pain is better with: Rest and elevation along with Norco  Pain is worse with:  working, being in the sling, not elevating arm  Treatment so far consists of:  rest, Ice, Norco, sling.   Associated Features: Swelling, Numbness:  entire hand  Prior history of related problems:   No previous problem in the affected area.  Pain is limiting work. Pain  "is limiting normal activities of daily living. Pain is waking at night. The patient has pain at rest.  Here for Orthopedic consultation.  Pain is about same to elbow worse to hand.         REVIEW OF SYSTEMS  General: negative for, night sweats, dizziness, fatigue  Resp: No shortness of breath and no cough  CV: negative for chest pain, syncope or near-syncope  GI: negative for nausea, vomiting and diarrhea  : negative for dysuria and hematuria  Musculoskeletal: as above  Neurologic: negative for syncope   Hematologic: negative for bleeding disorder    Physical Exam:  Vitals: Temp 97.6  F (36.4  C) (Temporal)  Ht 5' 7.25\" (1.708 m)  Wt (!) 362 lb (164.2 kg)  BMI 56.28 kg/m2  BMI= Body mass index is 56.28 kg/(m^2).  Constitutional: healthy, alert and no acute distress   Psychiatric: mentation appears normal and affect normal/bright  NEURO: no focal deficits  RESP: Normal with easy respirations and no use of accessory muscles to breathe, no audible wheezing or retractions  CV: RUE:  no edema         Regular rate and rhythm by palpation  SKIN: No erythema, rashes, excoriation, or breakdown. No evidence of infection.   JOINT/EXTREMITIES:right elbow: Tenderness over the lateral epicondyle and common extensor mass. No gross deformity. Increased pain with resisted wrist extension. No tenderness over the radiocapitellar joint.  GAIT: not tested             Diagnostic Modalities:  right elbow MRI:  No acute fractures or dislocations. Previous radial head fracture is healed with just very minimal depression approximately 2 mm. Some thickening the common extensor tendon and lateral ligaments. No focal tears or defects.  Independent visualization of the images was performed.      Impression: right elbow lateral epicondylitis (tennis elbow)  Right radial head fracture-healed    Plan:  All of the above pertinent physical exam and imaging modalities findings was reviewed with Antonio.                                          " CONSERVATIVE CARE:  I recommend conservative care for the patient to include steroid injections, activity modifications, bracing , rest. Today I provided or dispensed brace- Tennis elbow strap to be worn when active, cockup wrist splint to be worn when sleeping..                                        INJECTION PROCEDURE:  The patient was counseled about an  injection, including discussion of risks (including infection), contents of the injection, rationale for performing the injection, and expected benefits of the injection. The skin was prepped with alcohol and betadine and then utilizing sterile technique an injection of the right elbow lateral epicondyle  was performed. The injection consisted 1ml of Betamethasone (30mg per 5 ml) mixed with 1 ml of 0.5% Marcaine. The patient tolerated the injection well, and there were no complications. The injection site was covered with a Band-Aid. The injection was performed by Raymond Perez D.O.    Injection may take 10-14 days to notice improvement. Continue resting and modifying activities. Same work restrictions given.      Return to clinic 3 weeks, or sooner as needed for changes.  Re-x-ray on return: No    Raymond Perez D.O.          Again, thank you for allowing me to participate in the care of your patient.        Sincerely,        Dane Perez, DO

## 2017-11-16 NOTE — PROGRESS NOTES
"Office Visit-Follow up    Chief Complaint: Antonio Ravi is a 52 year old male who is being seen for   Chief Complaint   Patient presents with     RECHECK     MRI results of right elbow       History of Present Illness:   Today's visit  Returns for MRI results. Pain is unchanged. Worse with gripping.  November 9, 2017 visit:  Returns reevaluation right elbow. Complains of a worse pain when he is pushing with his wrist extended and gripping. Pain is located over the lateral epicondyle and common extensor area. Voltaren has not been helpful.  October 6, 2017 visit:  He has noticed some upper forearm pain over the last 3 weeks. Worsening. Worse with gripping. No new traumas or injuries. He has been using it more at work. \"Pulling palate jacks\"  September 7 2017 visit:  At previous visit was recommended for formal OT to continue to work on ROM but no lifting/pushing/pulling.  Since the last visit: he states overall the pain is better.  He has been trying very light  strength type exercise at home and states that does cause some pain.  He notes that with supination and pronation type activities will cause clicking and pain.  Has been using sling, has been following weight restrictions is working with no pulling pushing lifting. No numbness/tingling no pain to hand.  Overall feels improving.           8/10/17 visit  Patient states he feels like he is improving on motion, pain overall is mildly improved but he states some activities like range of motion do worsen the the pain.  He has been using the sling, doing gentle AROM to the elbow, ice, elevating and occasional APAP.  He states the numbness and tingling to his finger tips is about the same and mostly in the index, middle, ring, finger.  Patient states hand pain and swelling is improved.  Overall mildly better, still sore and feels stiff in elbow.  Deep ache in the elbow with occasional sharp pain.  No new symptoms or problems.       He does note he can do " ibuprofen but did have a kidney problem once with blood in his urine that they were concerned about his kidney but never followed up.  No previous bleeding ulcers or kidney failure history.       Visit from 8/3/17:  Right handed male.   Patient seen in ED on 7/27/17 after a fall and right elbow and forearm pain.  Patient was found to have proximal radial head fracture.  Patient was placed in a sling and recommended to follow up with orthopedics. Patient states since the fall the swelling in his hand and elbow will go down with elevation but once in the sling or with working the swelling increases again.  He states the elbow pain is about the same as it was 7 days ago after the fall but states the pain in his hand along the pinky finger is worsening.  He also states the global hand numbness is about the same, but can feel just feels more numb than the left hand.  Patient states initially hand was not bad and did not bring up the hand pain to the ED doctor but the hand pain has been progressively worse.   Mechanism of Injury: A direct strike to the affected area.  Pain resulted ground level fall. Tripped over toys fell onto outstretched right arm  Location: right elbow (global elbow), hand along pinky finger  Duration of Pain:  7 days since fall  Rating of Pain:  Moderate at times, severe at times.    Pain Quality: sharp and pressure  Pain is better with: Rest and elevation along with Norco  Pain is worse with:  working, being in the sling, not elevating arm  Treatment so far consists of:  rest, Ice, Norco, sling.   Associated Features: Swelling, Numbness:  entire hand  Prior history of related problems:   No previous problem in the affected area.  Pain is limiting work. Pain is limiting normal activities of daily living. Pain is waking at night. The patient has pain at rest.  Here for Orthopedic consultation.  Pain is about same to elbow worse to hand.         REVIEW OF SYSTEMS  General: negative for, night sweats,  "dizziness, fatigue  Resp: No shortness of breath and no cough  CV: negative for chest pain, syncope or near-syncope  GI: negative for nausea, vomiting and diarrhea  : negative for dysuria and hematuria  Musculoskeletal: as above  Neurologic: negative for syncope   Hematologic: negative for bleeding disorder    Physical Exam:  Vitals: Temp 97.6  F (36.4  C) (Temporal)  Ht 5' 7.25\" (1.708 m)  Wt (!) 362 lb (164.2 kg)  BMI 56.28 kg/m2  BMI= Body mass index is 56.28 kg/(m^2).  Constitutional: healthy, alert and no acute distress   Psychiatric: mentation appears normal and affect normal/bright  NEURO: no focal deficits  RESP: Normal with easy respirations and no use of accessory muscles to breathe, no audible wheezing or retractions  CV: RUE:  no edema         Regular rate and rhythm by palpation  SKIN: No erythema, rashes, excoriation, or breakdown. No evidence of infection.   JOINT/EXTREMITIES:right elbow: Tenderness over the lateral epicondyle and common extensor mass. No gross deformity. Increased pain with resisted wrist extension. No tenderness over the radiocapitellar joint.  GAIT: not tested             Diagnostic Modalities:  right elbow MRI:  No acute fractures or dislocations. Previous radial head fracture is healed with just very minimal depression approximately 2 mm. Some thickening the common extensor tendon and lateral ligaments. No focal tears or defects.  Independent visualization of the images was performed.      Impression: right elbow lateral epicondylitis (tennis elbow)  Right radial head fracture-healed    Plan:  All of the above pertinent physical exam and imaging modalities findings was reviewed with Antonio.                                          CONSERVATIVE CARE:  I recommend conservative care for the patient to include steroid injections, activity modifications, bracing , rest. Today I provided or dispensed brace- Tennis elbow strap to be worn when active, cockup wrist splint to be worn " when sleeping..                                        INJECTION PROCEDURE:  The patient was counseled about an  injection, including discussion of risks (including infection), contents of the injection, rationale for performing the injection, and expected benefits of the injection. The skin was prepped with alcohol and betadine and then utilizing sterile technique an injection of the right elbow lateral epicondyle  was performed. The injection consisted 1ml of Betamethasone (30mg per 5 ml) mixed with 1 ml of 0.5% Marcaine. The patient tolerated the injection well, and there were no complications. The injection site was covered with a Band-Aid. The injection was performed by Raymond Perez D.O.    Injection may take 10-14 days to notice improvement. Continue resting and modifying activities. Same work restrictions given.      Return to clinic 3 weeks, or sooner as needed for changes.  Re-x-ray on return: No    Raymond Perez D.O.

## 2017-11-16 NOTE — NURSING NOTE
"Chief Complaint   Patient presents with     RECHECK     MRI results of right elbow       Initial Temp 97.6  F (36.4  C) (Temporal)  Ht 1.708 m (5' 7.25\")  Wt (!) 164.2 kg (362 lb)  BMI 56.28 kg/m2 Estimated body mass index is 56.28 kg/(m^2) as calculated from the following:    Height as of this encounter: 1.708 m (5' 7.25\").    Weight as of this encounter: 164.2 kg (362 lb).  Medication Reconciliation: complete   Steph/SUKHI     "

## 2017-12-07 ENCOUNTER — OFFICE VISIT (OUTPATIENT)
Dept: ORTHOPEDICS | Facility: CLINIC | Age: 52
End: 2017-12-07

## 2017-12-07 ENCOUNTER — RADIANT APPOINTMENT (OUTPATIENT)
Dept: GENERAL RADIOLOGY | Facility: CLINIC | Age: 52
End: 2017-12-07
Attending: ORTHOPAEDIC SURGERY

## 2017-12-07 VITALS — BODY MASS INDEX: 49.44 KG/M2 | HEIGHT: 67 IN | WEIGHT: 315 LBS | TEMPERATURE: 97.4 F

## 2017-12-07 DIAGNOSIS — S52.121D CLOSED DISPLACED FRACTURE OF HEAD OF RIGHT RADIUS WITH ROUTINE HEALING, SUBSEQUENT ENCOUNTER: Primary | ICD-10-CM

## 2017-12-07 DIAGNOSIS — M79.642 PAIN OF LEFT HAND: ICD-10-CM

## 2017-12-07 DIAGNOSIS — M77.11 LATERAL EPICONDYLITIS OF RIGHT ELBOW: ICD-10-CM

## 2017-12-07 DIAGNOSIS — M79.642 PAIN OF LEFT HAND: Primary | ICD-10-CM

## 2017-12-07 PROCEDURE — 99213 OFFICE O/P EST LOW 20 MIN: CPT | Performed by: ORTHOPAEDIC SURGERY

## 2017-12-07 PROCEDURE — 73130 X-RAY EXAM OF HAND: CPT | Mod: TC

## 2017-12-07 RX ORDER — GABAPENTIN 100 MG/1
CAPSULE ORAL
Qty: 20 CAPSULE | Refills: 0 | Status: SHIPPED | OUTPATIENT
Start: 2017-12-07

## 2017-12-07 ASSESSMENT — PAIN SCALES - GENERAL: PAINLEVEL: NO PAIN (0)

## 2017-12-07 NOTE — MR AVS SNAPSHOT
"              After Visit Summary   2017    Antonio Ravi    MRN: [id number]           Patient Information     Date Of Birth          1965        Visit Information        Provider Department      2017 9:10 AM Dane Perez, DO [facility]        Today's Diagnoses     Pain of left hand    -  1       Follow-ups after your visit        Who to contact     If you have questions or need follow up information about today's clinic visit or your schedule please contact [facility] directly at [phone].  Normal or non-critical lab and imaging results will be communicated to you by Remergehart, letter or phone within 4 business days after the clinic has received the results. If you do not hear from us within 7 days, please contact the clinic through [employer] or phone. If you have a critical or abnormal lab result, we will notify you by phone as soon as possible.  Submit refill requests through [employer] or call your pharmacy and they will forward the refill request to us. Please allow 3 business days for your refill to be completed.          Additional Information About Your Visit        MyChart Information     [employer] lets you send messages to your doctor, view your test results, renew your prescriptions, schedule appointments and more. To sign up, go to [email or web address] . Click on \"Log in\" on the left side of the screen, which will take you to the Welcome page. Then click on \"Sign up Now\" on the right side of the page.     You will be asked to enter the access code listed below, as well as some personal information. Please follow the directions to create your username and password.     Your access code is: [email or web address]  Expires: 2018  8:29 AM     Your access code will  in 90 days. If you need help or a new code, please call your [facility] or [phone].        Care EveryWhere ID     This is your Care EveryWhere ID. This could be used by other " organizations to access your [place] medical records  [id number]         Blood Pressure from Last 3 Encounters:   07/28/17 (!) 138/109   08/23/16 (!) 148/100   08/31/15 116/72    Weight from Last 3 Encounters:   12/07/17 (!) 164.2 kg (362 lb)   11/16/17 (!) 164.2 kg (362 lb)   11/09/17 (!) 164.2 kg (362 lb)               Primary Care Provider Fax #    Physician No Ref-Primary [phone]       No address on file        Equal Access to Services     [facility] : Carlin Cooper, waaxverito luqadaha, qaybpaula kaalmaverito mauricio, amos wilson . So [facility] [phone].    ATENCIÓN: Si habla español, tiene a sampson disposición servicios gratuitos de asistencia lingüística. Llame al [phone].    We comply with applicable federal civil rights laws and [place] laws. We do not discriminate on the basis of race, color, national origin, age, disability, sex, sexual orientation, or gender identity.            Thank you!     Thank you for choosing [facility]  for your care. Our goal is always to provide you with excellent care. Hearing back from our patients is one way we can continue to improve our services. Please take a few minutes to complete the written survey that you may receive in the mail after your visit with us. Thank you!             Your Updated Medication List - Protect others around you: Learn how to safely use, store and throw away your medicines at www.disposemymeds.org.          This list is accurate as of: 12/7/17  9:39 AM.  Always use your most recent med list.                   Brand Name Dispense Instructions for use Diagnosis    diclofenac 75 MG EC tablet    VOLTAREN    60 tablet    Take 1 tablet (75 mg) by mouth 2 times daily as needed for moderate pain    Lateral epicondylitis of right elbow

## 2017-12-07 NOTE — LETTER
"    12/7/2017         RE: Antonio Ravi  [address]  [place] [address]        Dear Colleague,    Thank you for referring your patient, Antonio Ravi, to the [facility]. Please see a copy of my visit note below.    Office Visit-Follow up    Chief Complaint: Antonio Ravi is a 52 year old male who is being seen for   Chief Complaint   Patient presents with     Musculoskeletal Problem     left hand pain- DOI;12/4/2017       History of Present Illness:   Complains of on-and-off pain in his left hand for a couple years.  Occasionally would feel swollen and stiff.  He has had intermittent \"carpal tunnel\" to his fingers.  However Monday night he was at work he was pushing an object and he felt an acute onset of pain in the palm.  Associated with this was numbness and tingling to his index middle and ring finger.  It is been constant since.  He has difficulty flexing his hand.  It is worse with hand motion.      REVIEW OF SYSTEMS  General: negative for, night sweats, dizziness, fatigue  Resp: No shortness of breath and no cough  CV: negative for chest pain, syncope or near-syncope  GI: negative for nausea, vomiting and diarrhea  : negative for dysuria and hematuria  Musculoskeletal: as above  Neurologic: negative for syncope   Hematologic: negative for bleeding disorder    Physical Exam:  Vitals: There were no vitals taken for this visit.  BMI= There is no height or weight on file to calculate BMI.  Constitutional: healthy, alert and no acute distress   Psychiatric: mentation appears normal and affect normal/bright  NEURO: no focal deficits  RESP: Normal with easy respirations and no use of accessory muscles to breathe, no audible wheezing or retractions  CV: LUE:   hand only-  non-pitting edema         Regular rate and rhythm by palpation  SKIN: No erythema, rashes, excoriation, or breakdown. No evidence of infection.   JOINT/EXTREMITIES:left hand: Fingers are warm and dry with good " cap refill.  2+ radial pulse.  There is some swelling into the fingers.  He has some swelling and ecchymosis along the palmar aspect.  He has callus along the hyperthenar eminence.  Motion is stiff with flexion.  Grossly the flexors and extensors are intact.  There is no gross deformity  GAIT: not tested             Diagnostic Modalities:  left hand X-ray: Normal alignment. No fractures, dislocation or lesions. No soft tissue abnormalities.  Independent visualization of the images was performed.      Impression: left hand pain with acute onset numbness tingling    Plan:  All of the above pertinent physical exam and imaging modalities findings was reviewed with Antonio.    Options reviewed.  This is been going on 2 days.  I recommended attempted conservative care including rest elevation ice.  He Tim has diclofenac at home I recommend he take it as prescribed.  Also prescribed some Neurontin 200 mg at bedtime.  He reports he has had difficulty sleeping because of this.  See me back in 2 weeks.          Return to clinic 2, week(s), or sooner as needed for changes.  Re-x-ray on return: No    Raymond Perez D.O.          Again, thank you for allowing me to participate in the care of your patient.        Sincerely,        Dane Perez, DO

## 2017-12-07 NOTE — MR AVS SNAPSHOT
"              After Visit Summary   2017    Antonio Ravi    MRN: [id number]           Patient Information     Date Of Birth          1965        Visit Information        Provider Department      2017 9:20 AM Dane Perez,  [facility]        Today's Diagnoses     Closed displaced fracture of head of right radius with routine healing, subsequent encounter    -  1    Lateral epicondylitis of right elbow           Follow-ups after your visit        Who to contact     If you have questions or need follow up information about today's clinic visit or your schedule please contact [facility] directly at [phone].  Normal or non-critical lab and imaging results will be communicated to you by Nosopharmhart, letter or phone within 4 business days after the clinic has received the results. If you do not hear from us within 7 days, please contact the clinic through [employer] or phone. If you have a critical or abnormal lab result, we will notify you by phone as soon as possible.  Submit refill requests through [employer] or call your pharmacy and they will forward the refill request to us. Please allow 3 business days for your refill to be completed.          Additional Information About Your Visit        MyChart Information     [employer] lets you send messages to your doctor, view your test results, renew your prescriptions, schedule appointments and more. To sign up, go to [email or web address] . Click on \"Log in\" on the left side of the screen, which will take you to the Welcome page. Then click on \"Sign up Now\" on the right side of the page.     You will be asked to enter the access code listed below, as well as some personal information. Please follow the directions to create your username and password.     Your access code is: [email or web address]  Expires: 2018  8:29 AM     Your access code will  in 90 days. If you need help or a new code, please call your " "[facility] or [phone].        Care EveryWhere ID     This is your Care EveryWhere ID. This could be used by other organizations to access your [facility] medical records  [id number]        Your Vitals Were     Temperature Height BMI (Body Mass Index)             97.4  F (36.3  C) (Temporal) 1.708 m (5' 7.25\") 56.28 kg/m2          Blood Pressure from Last 3 Encounters:   07/28/17 (!) 138/109   08/23/16 (!) 148/100   08/31/15 116/72    Weight from Last 3 Encounters:   12/07/17 (!) 164.2 kg (362 lb)   11/16/17 (!) 164.2 kg (362 lb)   11/09/17 (!) 164.2 kg (362 lb)              Today, you had the following     No orders found for display       Primary Care Provider Fax #    Physician No Ref-Primary [phone]       No address on file        Equal Access to Services     VANESSA SHARP : Hadii aad ku hadasho Soomaali, waaxda luqadaha, qaybta kaalmada adeegyada, amos wilson . So [facility] [phone].    ATENCIÓN: Si habla español, tiene a sampson disposición servicios gratuitos de asistencia lingüística. Llame al [phone].    We comply with applicable federal civil rights laws and [place] laws. We do not discriminate on the basis of race, color, national origin, age, disability, sex, sexual orientation, or gender identity.            Thank you!     Thank you for choosing [facility]  for your care. Our goal is always to provide you with excellent care. Hearing back from our patients is one way we can continue to improve our services. Please take a few minutes to complete the written survey that you may receive in the mail after your visit with us. Thank you!             Your Updated Medication List - Protect others around you: Learn how to safely use, store and throw away your medicines at www.disposemymeds.org.          This list is accurate as of: 12/7/17  9:39 AM.  Always use your most recent med list.                   Brand Name Dispense Instructions for use " Diagnosis    diclofenac 75 MG EC tablet    VOLTAREN    60 tablet    Take 1 tablet (75 mg) by mouth 2 times daily as needed for moderate pain    Lateral epicondylitis of right elbow

## 2017-12-07 NOTE — LETTER
12/7/2017       Antonio Ravi  [address] [address]        To Whom It May Concern:    Antonio Ravi was seen in our clinic. He may return to work with the following: no lift, push or pulling greater than 10lbs with Right upper extremity until follow up.    Next appointment is 2 weeks.          Sincerely,        Dane Perez, DO

## 2017-12-07 NOTE — PROGRESS NOTES
"Office Visit-Follow up    Chief Complaint: Antonio Ravi is a 52 year old male who is being seen for   Chief Complaint   Patient presents with     Musculoskeletal Problem     left hand pain- DOI;12/4/2017       History of Present Illness:   Complains of on-and-off pain in his left hand for a couple years.  Occasionally would feel swollen and stiff.  He has had intermittent \"carpal tunnel\" to his fingers.  However Monday night he was at work he was pushing an object and he felt an acute onset of pain in the palm.  Associated with this was numbness and tingling to his index middle and ring finger.  It is been constant since.  He has difficulty flexing his hand.  It is worse with hand motion.      REVIEW OF SYSTEMS  General: negative for, night sweats, dizziness, fatigue  Resp: No shortness of breath and no cough  CV: negative for chest pain, syncope or near-syncope  GI: negative for nausea, vomiting and diarrhea  : negative for dysuria and hematuria  Musculoskeletal: as above  Neurologic: negative for syncope   Hematologic: negative for bleeding disorder    Physical Exam:  Vitals: There were no vitals taken for this visit.  BMI= There is no height or weight on file to calculate BMI.  Constitutional: healthy, alert and no acute distress   Psychiatric: mentation appears normal and affect normal/bright  NEURO: no focal deficits  RESP: Normal with easy respirations and no use of accessory muscles to breathe, no audible wheezing or retractions  CV: LUE:   hand only-  non-pitting edema         Regular rate and rhythm by palpation  SKIN: No erythema, rashes, excoriation, or breakdown. No evidence of infection.   JOINT/EXTREMITIES:left hand: Fingers are warm and dry with good cap refill.  2+ radial pulse.  There is some swelling into the fingers.  He has some swelling and ecchymosis along the palmar aspect.  He has callus along the hyperthenar eminence.  Motion is stiff with flexion.  Grossly the flexors and extensors " are intact.  There is no gross deformity  GAIT: not tested             Diagnostic Modalities:  left hand X-ray: Normal alignment. No fractures, dislocation or lesions. No soft tissue abnormalities.  Independent visualization of the images was performed.      Impression: left hand pain with acute onset numbness tingling    Plan:  All of the above pertinent physical exam and imaging modalities findings was reviewed with Antonio.    Options reviewed.  This is been going on 2 days.  I recommended attempted conservative care including rest elevation ice.  He Tim has diclofenac at home I recommend he take it as prescribed.  Also prescribed some Neurontin 200 mg at bedtime.  He reports he has had difficulty sleeping because of this.  See me back in 2 weeks.          Return to clinic 2, week(s), or sooner as needed for changes.  Re-x-ray on return: No    Raymond Perez D.O.

## 2017-12-07 NOTE — NURSING NOTE
"Chief Complaint   Patient presents with     RECHECK     right elbow lateral epicondylitis (tennis elbow), Right radial head fracture-healed- DOI:7/27/17       Initial Temp 97.4  F (36.3  C) (Temporal)  Ht 1.708 m (5' 7.25\")  Wt (!) 164.2 kg (362 lb)  BMI 56.28 kg/m2 Estimated body mass index is 56.28 kg/(m^2) as calculated from the following:    Height as of this encounter: 1.708 m (5' 7.25\").    Weight as of this encounter: 164.2 kg (362 lb).  Medication Reconciliation: complete   Steph/SUKHI     "

## 2017-12-07 NOTE — PROGRESS NOTES
"Office Visit-Follow up    Chief Complaint: Antonio Ravi is a 52 year old male who is being seen for   Chief Complaint   Patient presents with     RECHECK     right elbow lateral epicondylitis (tennis elbow), Right radial head fracture-healed- DOI:7/27/17       History of Present Illness:   Today's visit  Return to discuss right elbow.  Previous injection has provided good relief.  Very minimal pain.  November 16, 2017 visit:  Returns for MRI results. Pain is unchanged. Worse with gripping.  November 9, 2017 visit:  Returns reevaluation right elbow. Complains of a worse pain when he is pushing with his wrist extended and gripping. Pain is located over the lateral epicondyle and common extensor area. Voltaren has not been helpful.  October 6, 2017 visit:  He has noticed some upper forearm pain over the last 3 weeks. Worsening. Worse with gripping. No new traumas or injuries. He has been using it more at work. \"Pulling palate jacks\"  September 7 2017 visit:  At previous visit was recommended for formal OT to continue to work on ROM but no lifting/pushing/pulling.  Since the last visit: he states overall the pain is better.  He has been trying very light  strength type exercise at home and states that does cause some pain.  He notes that with supination and pronation type activities will cause clicking and pain.  Has been using sling, has been following weight restrictions is working with no pulling pushing lifting. No numbness/tingling no pain to hand.  Overall feels improving.           8/10/17 visit  Patient states he feels like he is improving on motion, pain overall is mildly improved but he states some activities like range of motion do worsen the the pain.  He has been using the sling, doing gentle AROM to the elbow, ice, elevating and occasional APAP.  He states the numbness and tingling to his finger tips is about the same and mostly in the index, middle, ring, finger.  Patient states hand pain and " swelling is improved.  Overall mildly better, still sore and feels stiff in elbow.  Deep ache in the elbow with occasional sharp pain.  No new symptoms or problems.       He does note he can do ibuprofen but did have a kidney problem once with blood in his urine that they were concerned about his kidney but never followed up.  No previous bleeding ulcers or kidney failure history.       Visit from 8/3/17:  Right handed male.   Patient seen in ED on 7/27/17 after a fall and right elbow and forearm pain.  Patient was found to have proximal radial head fracture.  Patient was placed in a sling and recommended to follow up with orthopedics. Patient states since the fall the swelling in his hand and elbow will go down with elevation but once in the sling or with working the swelling increases again.  He states the elbow pain is about the same as it was 7 days ago after the fall but states the pain in his hand along the pinky finger is worsening.  He also states the global hand numbness is about the same, but can feel just feels more numb than the left hand.  Patient states initially hand was not bad and did not bring up the hand pain to the ED doctor but the hand pain has been progressively worse.   Mechanism of Injury: A direct strike to the affected area.  Pain resulted ground level fall. Tripped over toys fell onto outstretched right arm  Location: right elbow (global elbow), hand along pinky finger  Duration of Pain:  7 days since fall  Rating of Pain:  Moderate at times, severe at times.    Pain Quality: sharp and pressure  Pain is better with: Rest and elevation along with Norco  Pain is worse with:  working, being in the sling, not elevating arm  Treatment so far consists of:  rest, Ice, Norco, sling.   Associated Features: Swelling, Numbness:  entire hand  Prior history of related problems:   No previous problem in the affected area.  Pain is limiting work. Pain is limiting normal activities of daily living. Pain  "is waking at night. The patient has pain at rest.  Here for Orthopedic consultation.  Pain is about same to elbow worse to hand.         REVIEW OF SYSTEMS  General: negative for, night sweats, dizziness, fatigue  Resp: No shortness of breath and no cough  CV: negative for chest pain, syncope or near-syncope  GI: negative for nausea, vomiting and diarrhea  : negative for dysuria and hematuria  Musculoskeletal: as above  Neurologic: negative for syncope   Hematologic: negative for bleeding disorder    Physical Exam:  Vitals: Temp 97.4  F (36.3  C) (Temporal)  Ht 5' 7.25\" (1.708 m)  Wt (!) 362 lb (164.2 kg)  BMI 56.28 kg/m2  BMI= Body mass index is 56.28 kg/(m^2).  Constitutional: healthy, alert and no acute distress   Psychiatric: mentation appears normal and affect normal/bright  NEURO: no focal deficits  RESP: Normal with easy respirations and no use of accessory muscles to breathe, no audible wheezing or retractions  CV: RUE:  no edema         Regular rate and rhythm by palpation  SKIN: No erythema, rashes, excoriation, or breakdown. No evidence of infection.   JOINT/EXTREMITIES:right elbow: Full range of motion including flexion-extension and rotation.  No radial head tenderness.  Some minimal tenderness on the common extensor.  GAIT: not tested             Diagnostic Modalities:  None today.  Independent visualization of the images was performed.      Impression: right elbow lateral epicondylitis with improvement    Plan:  All of the above pertinent physical exam and imaging modalities findings was reviewed with Antonio.    Injection has provided good relief.  Recommend he still continue using his braces for protection.  Would recommend no lifting, pushing, pulling greater than 10 pounds with that arm.  Plan to see him back in 2 weeks.  Hopefully can advance his activity    Return to clinic 2, week(s), or sooner as needed for changes.  Re-x-ray on return: No    Raymond Perez D.O.        "

## 2017-12-21 ENCOUNTER — OFFICE VISIT (OUTPATIENT)
Dept: ORTHOPEDICS | Facility: CLINIC | Age: 52
End: 2017-12-21

## 2017-12-21 VITALS — TEMPERATURE: 98.2 F | BODY MASS INDEX: 49.44 KG/M2 | HEIGHT: 67 IN | WEIGHT: 315 LBS

## 2017-12-21 DIAGNOSIS — M77.11 LATERAL EPICONDYLITIS OF RIGHT ELBOW: Primary | ICD-10-CM

## 2017-12-21 PROCEDURE — 99213 OFFICE O/P EST LOW 20 MIN: CPT | Performed by: ORTHOPAEDIC SURGERY

## 2017-12-21 ASSESSMENT — PAIN SCALES - GENERAL: PAINLEVEL: MILD PAIN (3)

## 2017-12-21 NOTE — LETTER
"    12/21/2017         RE: Antonio Ravi  [address] [facility] [address]        Dear Colleague,    Thank you for referring your patient, Antonio Ravi, to the [facility]. Please see a copy of my visit note below.    Office Visit-Follow up    Chief Complaint: Antonio Ravi is a 52 year old male who is being seen for   Chief Complaint   Patient presents with     RECHECK     right elbow lateral epicondylitis (tennis elbow), Right radial head fracture-healed- DOI:7/27/17       History of Present Illness:   Today's visit  Turns discuss right elbow.  He has been doing more activity as of late.  He has noticed some clicking at times and some discomfort.  No new injuries.  December 7, 2017 visit:  Return to discuss right elbow.  Previous injection has provided good relief.  Very minimal pain.  November 16, 2017 visit:  Returns for MRI results. Pain is unchanged. Worse with gripping.  November 9, 2017 visit:  Returns reevaluation right elbow. Complains of a worse pain when he is pushing with his wrist extended and gripping. Pain is located over the lateral epicondyle and common extensor area. Voltaren has not been helpful.  October 6, 2017 visit:  He has noticed some upper forearm pain over the last 3 weeks. Worsening. Worse with gripping. No new traumas or injuries. He has been using it more at work. \"Pulling palate jacks\"  September 7 2017 visit:  At previous visit was recommended for formal OT to continue to work on ROM but no lifting/pushing/pulling.  Since the last visit: he states overall the pain is better.  He has been trying very light  strength type exercise at home and states that does cause some pain.  He notes that with supination and pronation type activities will cause clicking and pain.  Has been using sling, has been following weight restrictions is working with no pulling pushing lifting. No numbness/tingling no pain to hand.  Overall feels improving. "           8/10/17 visit  Patient states he feels like he is improving on motion, pain overall is mildly improved but he states some activities like range of motion do worsen the the pain.  He has been using the sling, doing gentle AROM to the elbow, ice, elevating and occasional APAP.  He states the numbness and tingling to his finger tips is about the same and mostly in the index, middle, ring, finger.  Patient states hand pain and swelling is improved.  Overall mildly better, still sore and feels stiff in elbow.  Deep ache in the elbow with occasional sharp pain.  No new symptoms or problems.       He does note he can do ibuprofen but did have a kidney problem once with blood in his urine that they were concerned about his kidney but never followed up.  No previous bleeding ulcers or kidney failure history.       Visit from 8/3/17:  Right handed male.   Patient seen in ED on 7/27/17 after a fall and right elbow and forearm pain.  Patient was found to have proximal radial head fracture.  Patient was placed in a sling and recommended to follow up with orthopedics. Patient states since the fall the swelling in his hand and elbow will go down with elevation but once in the sling or with working the swelling increases again.  He states the elbow pain is about the same as it was 7 days ago after the fall but states the pain in his hand along the pinky finger is worsening.  He also states the global hand numbness is about the same, but can feel just feels more numb than the left hand.  Patient states initially hand was not bad and did not bring up the hand pain to the ED doctor but the hand pain has been progressively worse.   Mechanism of Injury: A direct strike to the affected area.  Pain resulted ground level fall. Tripped over toys fell onto outstretched right arm  Location: right elbow (global elbow), hand along pinky finger  Duration of Pain:  7 days since fall  Rating of Pain:  Moderate at times, severe at times.  "   Pain Quality: sharp and pressure  Pain is better with: Rest and elevation along with Norco  Pain is worse with:  working, being in the sling, not elevating arm  Treatment so far consists of:  rest, Ice, Norco, sling.   Associated Features: Swelling, Numbness:  entire hand  Prior history of related problems:   No previous problem in the affected area.  Pain is limiting work. Pain is limiting normal activities of daily living. Pain is waking at night. The patient has pain at rest.  Here for Orthopedic consultation.  Pain is about same to elbow worse to hand.               REVIEW OF SYSTEMS  General: negative for, night sweats, dizziness, fatigue  Resp: No shortness of breath and no cough  CV: negative for chest pain, syncope or near-syncope  GI: negative for nausea, vomiting and diarrhea  : negative for dysuria and hematuria  Musculoskeletal: as above  Neurologic: negative for syncope   Hematologic: negative for bleeding disorder    Physical Exam:  Vitals: Temp 98.2  F (36.8  C) (Temporal)  Ht 5' 7.25\" (1.708 m)  Wt (!) 362 lb (164.2 kg)  BMI 56.28 kg/m2  BMI= Body mass index is 56.28 kg/(m^2).  Constitutional: healthy, alert and no acute distress   Psychiatric: mentation appears normal and affect normal/bright  NEURO: no focal deficits  RESP: Normal with easy respirations and no use of accessory muscles to breathe, no audible wheezing or retractions  CV: RUE:  no edema         Regular rate and rhythm by palpation  SKIN: No erythema, rashes, excoriation, or breakdown. No evidence of infection.   JOINT/EXTREMITIES:right elbow: Full motion.  Some tenderness over the common extensor mass.  No lateral epicondyle tenderness.  No radial head tenderness.  Minimal discomfort with resisted wrist extension.  GAIT: not tested             Diagnostic Modalities:  None today.  Independent visualization of the images was performed.      Impression: right lateral epicondylitis    Plan:  All of the above pertinent physical " exam and imaging modalities findings was reviewed with Antonio.    Options reviewed.  He has had some discomfort as he has gotten back to activity.  I recommend he continue working on getting back to activity working on his endurance and flexibility.  New work note provided.  No lifting, pushing, pulling greater than 20 pounds.  See me back in 2 weeks could consider allowing him to do more.        Return to clinic 2, week(s), or sooner as needed for changes.  Re-x-ray on return: No    Raymond Perez D.O.          Again, thank you for allowing me to participate in the care of your patient.        Sincerely,        Dane Perez, DO

## 2017-12-21 NOTE — PROGRESS NOTES
"Office Visit-Follow up    Chief Complaint: Antonio Ravi is a 52 year old male who is being seen for   Chief Complaint   Patient presents with     RECHECK     right elbow lateral epicondylitis (tennis elbow), Right radial head fracture-healed- DOI:7/27/17       History of Present Illness:   Today's visit  Turns discuss right elbow.  He has been doing more activity as of late.  He has noticed some clicking at times and some discomfort.  No new injuries.  December 7, 2017 visit:  Return to discuss right elbow.  Previous injection has provided good relief.  Very minimal pain.  November 16, 2017 visit:  Returns for MRI results. Pain is unchanged. Worse with gripping.  November 9, 2017 visit:  Returns reevaluation right elbow. Complains of a worse pain when he is pushing with his wrist extended and gripping. Pain is located over the lateral epicondyle and common extensor area. Voltaren has not been helpful.  October 6, 2017 visit:  He has noticed some upper forearm pain over the last 3 weeks. Worsening. Worse with gripping. No new traumas or injuries. He has been using it more at work. \"Pulling palate jacks\"  September 7 2017 visit:  At previous visit was recommended for formal OT to continue to work on ROM but no lifting/pushing/pulling.  Since the last visit: he states overall the pain is better.  He has been trying very light  strength type exercise at home and states that does cause some pain.  He notes that with supination and pronation type activities will cause clicking and pain.  Has been using sling, has been following weight restrictions is working with no pulling pushing lifting. No numbness/tingling no pain to hand.  Overall feels improving.           8/10/17 visit  Patient states he feels like he is improving on motion, pain overall is mildly improved but he states some activities like range of motion do worsen the the pain.  He has been using the sling, doing gentle AROM to the elbow, ice, elevating " and occasional APAP.  He states the numbness and tingling to his finger tips is about the same and mostly in the index, middle, ring, finger.  Patient states hand pain and swelling is improved.  Overall mildly better, still sore and feels stiff in elbow.  Deep ache in the elbow with occasional sharp pain.  No new symptoms or problems.       He does note he can do ibuprofen but did have a kidney problem once with blood in his urine that they were concerned about his kidney but never followed up.  No previous bleeding ulcers or kidney failure history.       Visit from 8/3/17:  Right handed male.   Patient seen in ED on 7/27/17 after a fall and right elbow and forearm pain.  Patient was found to have proximal radial head fracture.  Patient was placed in a sling and recommended to follow up with orthopedics. Patient states since the fall the swelling in his hand and elbow will go down with elevation but once in the sling or with working the swelling increases again.  He states the elbow pain is about the same as it was 7 days ago after the fall but states the pain in his hand along the pinky finger is worsening.  He also states the global hand numbness is about the same, but can feel just feels more numb than the left hand.  Patient states initially hand was not bad and did not bring up the hand pain to the ED doctor but the hand pain has been progressively worse.   Mechanism of Injury: A direct strike to the affected area.  Pain resulted ground level fall. Tripped over toys fell onto outstretched right arm  Location: right elbow (global elbow), hand along pinky finger  Duration of Pain:  7 days since fall  Rating of Pain:  Moderate at times, severe at times.    Pain Quality: sharp and pressure  Pain is better with: Rest and elevation along with Norco  Pain is worse with:  working, being in the sling, not elevating arm  Treatment so far consists of:  rest, Ice, Norco, sling.   Associated Features: Swelling, Numbness:  " entire hand  Prior history of related problems:   No previous problem in the affected area.  Pain is limiting work. Pain is limiting normal activities of daily living. Pain is waking at night. The patient has pain at rest.  Here for Orthopedic consultation.  Pain is about same to elbow worse to hand.               REVIEW OF SYSTEMS  General: negative for, night sweats, dizziness, fatigue  Resp: No shortness of breath and no cough  CV: negative for chest pain, syncope or near-syncope  GI: negative for nausea, vomiting and diarrhea  : negative for dysuria and hematuria  Musculoskeletal: as above  Neurologic: negative for syncope   Hematologic: negative for bleeding disorder    Physical Exam:  Vitals: Temp 98.2  F (36.8  C) (Temporal)  Ht 5' 7.25\" (1.708 m)  Wt (!) 362 lb (164.2 kg)  BMI 56.28 kg/m2  BMI= Body mass index is 56.28 kg/(m^2).  Constitutional: healthy, alert and no acute distress   Psychiatric: mentation appears normal and affect normal/bright  NEURO: no focal deficits  RESP: Normal with easy respirations and no use of accessory muscles to breathe, no audible wheezing or retractions  CV: RUE:  no edema         Regular rate and rhythm by palpation  SKIN: No erythema, rashes, excoriation, or breakdown. No evidence of infection.   JOINT/EXTREMITIES:right elbow: Full motion.  Some tenderness over the common extensor mass.  No lateral epicondyle tenderness.  No radial head tenderness.  Minimal discomfort with resisted wrist extension.  GAIT: not tested             Diagnostic Modalities:  None today.  Independent visualization of the images was performed.      Impression: right lateral epicondylitis    Plan:  All of the above pertinent physical exam and imaging modalities findings was reviewed with Antonio.    Options reviewed.  He has had some discomfort as he has gotten back to activity.  I recommend he continue working on getting back to activity working on his endurance and flexibility.  New work note " provided.  No lifting, pushing, pulling greater than 20 pounds.  See me back in 2 weeks could consider allowing him to do more.        Return to clinic 2, week(s), or sooner as needed for changes.  Re-x-ray on return: No    Raymond Perez D.O.

## 2017-12-21 NOTE — MR AVS SNAPSHOT
"              After Visit Summary   2017    Antonio Ravi    MRN: [id number]           Patient Information     Date Of Birth          1965        Visit Information        Provider Department      2017 10:20 AM Dane Perez, DO [facility]        Today's Diagnoses     Lateral epicondylitis of right elbow    -  1       Follow-ups after your visit        Who to contact     If you have questions or need follow up information about today's clinic visit or your schedule please contact [facility] directly at [phone].  Normal or non-critical lab and imaging results will be communicated to you by [employer], letter or phone within 4 business days after the clinic has received the results. If you do not hear from us within 7 days, please contact the clinic through [employer] or phone. If you have a critical or abnormal lab result, we will notify you by phone as soon as possible.  Submit refill requests through [employer] or call your pharmacy and they will forward the refill request to us. Please allow 3 business days for your refill to be completed.          Additional Information About Your Visit        MyChart Information     [employer] lets you send messages to your doctor, view your test results, renew your prescriptions, schedule appointments and more. To sign up, go to [email or web address] . Click on \"Log in\" on the left side of the screen, which will take you to the Welcome page. Then click on \"Sign up Now\" on the right side of the page.     You will be asked to enter the access code listed below, as well as some personal information. Please follow the directions to create your username and password.     Your access code is: [email or web address]  Expires: 2018  8:29 AM     Your access code will  in 90 days. If you need help or a new code, please call your [facility] or [phone].        Care EveryWhere ID     This is your Care EveryWhere ID. This " "could be used by other organizations to access your [place] medical records  [id number]        Your Vitals Were     Temperature Height BMI (Body Mass Index)             98.2  F (36.8  C) (Temporal) 5' 7.25\" (1.708 m) 56.28 kg/m2          Blood Pressure from Last 3 Encounters:   07/28/17 (!) 138/109   08/23/16 (!) 148/100   08/31/15 116/72    Weight from Last 3 Encounters:   12/21/17 (!) 362 lb (164.2 kg)   12/07/17 (!) 362 lb (164.2 kg)   11/16/17 (!) 362 lb (164.2 kg)              Today, you had the following     No orders found for display       Primary Care Provider Fax #    Physician No Ref-Primary [phone]       No address on file        Equal Access to Services     VANESSA SHARP : Carlin Cooper, wamichelle lang, aleks kaaljenna mauricio, amos wilson . So [facility] [phone].    ATENCIÓN: Si habla español, tiene a sampson disposición servicios gratuitos de asistencia lingüística. Roly al [phone].    We comply with applicable federal civil rights laws and [place] laws. We do not discriminate on the basis of race, color, national origin, age, disability, sex, sexual orientation, or gender identity.            Thank you!     Thank you for choosing [facility]  for your care. Our goal is always to provide you with excellent care. Hearing back from our patients is one way we can continue to improve our services. Please take a few minutes to complete the written survey that you may receive in the mail after your visit with us. Thank you!             Your Updated Medication List - Protect others around you: Learn how to safely use, store and throw away your medicines at www.disposemymeds.org.          This list is accurate as of: 12/21/17 10:27 AM.  Always use your most recent med list.                   Brand Name Dispense Instructions for use Diagnosis    diclofenac 75 MG EC tablet    VOLTAREN    60 tablet    Take 1 tablet (75 mg) by mouth 2 times " daily as needed for moderate pain    Lateral epicondylitis of right elbow       gabapentin 100 MG capsule    NEURONTIN    20 capsule    200mg at bedtime as needed    Pain of left hand

## 2017-12-21 NOTE — LETTER
December 21, 2017      Antonio Ravi  [address] [facility] [address]        To Whom It May Concern:    Antonio Ravi was seen in our clinic. He may return to work with the following: no push pull or lift greater than 20lbs with Right upper extremity until follow up.     Next appointment is 2 weeks.      Sincerely,        Dane Perez, DO

## 2019-07-23 NOTE — LETTER
"    12/7/2017         RE: Antonio Ravi  [address] [facility] [address]        Dear Colleague,    Thank you for referring your patient, Antonio Ravi, to the [facility]. Please see a copy of my visit note below.    Office Visit-Follow up    Chief Complaint: Antonio Ravi is a 52 year old male who is being seen for   Chief Complaint   Patient presents with     RECHECK     right elbow lateral epicondylitis (tennis elbow), Right radial head fracture-healed- DOI:7/27/17       History of Present Illness:   Today's visit  Return to discuss right elbow.  Previous injection has provided good relief.  Very minimal pain.  November 16, 2017 visit:  Returns for MRI results. Pain is unchanged. Worse with gripping.  November 9, 2017 visit:  Returns reevaluation right elbow. Complains of a worse pain when he is pushing with his wrist extended and gripping. Pain is located over the lateral epicondyle and common extensor area. Voltaren has not been helpful.  October 6, 2017 visit:  He has noticed some upper forearm pain over the last 3 weeks. Worsening. Worse with gripping. No new traumas or injuries. He has been using it more at work. \"Pulling palate jacks\"  September 7 2017 visit:  At previous visit was recommended for formal OT to continue to work on ROM but no lifting/pushing/pulling.  Since the last visit: he states overall the pain is better.  He has been trying very light  strength type exercise at home and states that does cause some pain.  He notes that with supination and pronation type activities will cause clicking and pain.  Has been using sling, has been following weight restrictions is working with no pulling pushing lifting. No numbness/tingling no pain to hand.  Overall feels improving.           8/10/17 visit  Patient states he feels like he is improving on motion, pain overall is mildly improved but he states some activities like range of motion do worsen the the pain.  He " has been using the sling, doing gentle AROM to the elbow, ice, elevating and occasional APAP.  He states the numbness and tingling to his finger tips is about the same and mostly in the index, middle, ring, finger.  Patient states hand pain and swelling is improved.  Overall mildly better, still sore and feels stiff in elbow.  Deep ache in the elbow with occasional sharp pain.  No new symptoms or problems.       He does note he can do ibuprofen but did have a kidney problem once with blood in his urine that they were concerned about his kidney but never followed up.  No previous bleeding ulcers or kidney failure history.       Visit from 8/3/17:  Right handed male.   Patient seen in ED on 7/27/17 after a fall and right elbow and forearm pain.  Patient was found to have proximal radial head fracture.  Patient was placed in a sling and recommended to follow up with orthopedics. Patient states since the fall the swelling in his hand and elbow will go down with elevation but once in the sling or with working the swelling increases again.  He states the elbow pain is about the same as it was 7 days ago after the fall but states the pain in his hand along the pinky finger is worsening.  He also states the global hand numbness is about the same, but can feel just feels more numb than the left hand.  Patient states initially hand was not bad and did not bring up the hand pain to the ED doctor but the hand pain has been progressively worse.   Mechanism of Injury: A direct strike to the affected area.  Pain resulted ground level fall. Tripped over toys fell onto outstretched right arm  Location: right elbow (global elbow), hand along pinky finger  Duration of Pain:  7 days since fall  Rating of Pain:  Moderate at times, severe at times.    Pain Quality: sharp and pressure  Pain is better with: Rest and elevation along with Norco  Pain is worse with:  working, being in the sling, not elevating arm  Treatment so far consists  "of:  rest, Ice, Norco, sling.   Associated Features: Swelling, Numbness:  entire hand  Prior history of related problems:   No previous problem in the affected area.  Pain is limiting work. Pain is limiting normal activities of daily living. Pain is waking at night. The patient has pain at rest.  Here for Orthopedic consultation.  Pain is about same to elbow worse to hand.         REVIEW OF SYSTEMS  General: negative for, night sweats, dizziness, fatigue  Resp: No shortness of breath and no cough  CV: negative for chest pain, syncope or near-syncope  GI: negative for nausea, vomiting and diarrhea  : negative for dysuria and hematuria  Musculoskeletal: as above  Neurologic: negative for syncope   Hematologic: negative for bleeding disorder    Physical Exam:  Vitals: Temp 97.4  F (36.3  C) (Temporal)  Ht 5' 7.25\" (1.708 m)  Wt (!) 362 lb (164.2 kg)  BMI 56.28 kg/m2  BMI= Body mass index is 56.28 kg/(m^2).  Constitutional: healthy, alert and no acute distress   Psychiatric: mentation appears normal and affect normal/bright  NEURO: no focal deficits  RESP: Normal with easy respirations and no use of accessory muscles to breathe, no audible wheezing or retractions  CV: RUE:  no edema         Regular rate and rhythm by palpation  SKIN: No erythema, rashes, excoriation, or breakdown. No evidence of infection.   JOINT/EXTREMITIES:right elbow: Full range of motion including flexion-extension and rotation.  No radial head tenderness.  Some minimal tenderness on the common extensor.  GAIT: not tested             Diagnostic Modalities:  None today.  Independent visualization of the images was performed.      Impression: right elbow lateral epicondylitis with improvement    Plan:  All of the above pertinent physical exam and imaging modalities findings was reviewed with Antonio.    Injection has provided good relief.  Recommend he still continue using his braces for protection.  Would recommend no lifting, pushing, pulling " greater than 10 pounds with that arm.  Plan to see him back in 2 weeks.  Hopefully can advance his activity    Return to clinic 2, week(s), or sooner as needed for changes.  Re-x-ray on return: No    Raymond Perez D.O.          Again, thank you for allowing me to participate in the care of your patient.        Sincerely,        Dane Perez, DO     PAST MEDICAL HISTORY:  Afib     Arthritis     Asthma     Carcinoma of bladder     Chronic back pain     Chronic fatigue     Chronic knee pain     Diverticulitis     GERD (gastroesophageal reflux disease)     HLD (hyperlipidemia)     HTN (hypertension)     Low vitamin D level     Lumbar back pain with radiculopathy affecting right lower extremity     Malignant neoplasm of breast     Sciatica